# Patient Record
Sex: FEMALE | Race: BLACK OR AFRICAN AMERICAN | Employment: OTHER | URBAN - METROPOLITAN AREA
[De-identification: names, ages, dates, MRNs, and addresses within clinical notes are randomized per-mention and may not be internally consistent; named-entity substitution may affect disease eponyms.]

---

## 2017-10-21 PROBLEM — J45.901 ASTHMA EXACERBATION: Status: ACTIVE | Noted: 2017-10-21

## 2017-10-21 PROBLEM — H92.09 OTALGIA: Status: ACTIVE | Noted: 2017-05-24

## 2017-10-22 PROBLEM — J45.41 MODERATE PERSISTENT ASTHMA WITH EXACERBATION: Status: ACTIVE | Noted: 2017-10-21

## 2018-09-29 ENCOUNTER — APPOINTMENT (OUTPATIENT)
Dept: GENERAL RADIOLOGY | Age: 83
DRG: 202 | End: 2018-09-29
Payer: MEDICARE

## 2018-09-29 ENCOUNTER — HOSPITAL ENCOUNTER (INPATIENT)
Age: 83
LOS: 3 days | Discharge: HOME | DRG: 202 | End: 2018-10-05
Attending: EMERGENCY MEDICINE | Admitting: INTERNAL MEDICINE
Payer: MEDICARE

## 2018-09-29 DIAGNOSIS — J44.1 COPD EXACERBATION (HCC): Primary | ICD-10-CM

## 2018-09-29 PROBLEM — J45.901 ASTHMA EXACERBATION, MILD: Status: ACTIVE | Noted: 2018-09-29

## 2018-09-29 LAB
ANION GAP SERPL CALCULATED.3IONS-SCNC: 15 MMOL/L (ref 3–16)
BASE EXCESS VENOUS: 1 (ref -3–3)
BASOPHILS ABSOLUTE: 0 K/UL (ref 0–0.2)
BASOPHILS RELATIVE PERCENT: 0.6 %
BUN BLDV-MCNC: 28 MG/DL (ref 7–20)
CALCIUM SERPL-MCNC: 10 MG/DL (ref 8.3–10.6)
CHLORIDE BLD-SCNC: 99 MMOL/L (ref 99–110)
CO2: 26 MMOL/L (ref 21–32)
CREAT SERPL-MCNC: 1.1 MG/DL (ref 0.6–1.2)
EOSINOPHILS ABSOLUTE: 0.1 K/UL (ref 0–0.6)
EOSINOPHILS RELATIVE PERCENT: 3.3 %
GFR AFRICAN AMERICAN: 58
GFR NON-AFRICAN AMERICAN: 48
GLUCOSE BLD-MCNC: 163 MG/DL (ref 70–99)
GLUCOSE BLD-MCNC: 345 MG/DL (ref 70–99)
HCO3 VENOUS: 26.2 MMOL/L (ref 23–29)
HCT VFR BLD CALC: 38.2 % (ref 36–48)
HEMOGLOBIN: 13.1 G/DL (ref 12–16)
LACTATE: 2.62 MMOL/L (ref 0.4–2)
LYMPHOCYTES ABSOLUTE: 1.4 K/UL (ref 1–5.1)
LYMPHOCYTES RELATIVE PERCENT: 31.6 %
MCH RBC QN AUTO: 31.8 PG (ref 26–34)
MCHC RBC AUTO-ENTMCNC: 34.2 G/DL (ref 31–36)
MCV RBC AUTO: 93.2 FL (ref 80–100)
MONOCYTES ABSOLUTE: 0.4 K/UL (ref 0–1.3)
MONOCYTES RELATIVE PERCENT: 9.2 %
NEUTROPHILS ABSOLUTE: 2.4 K/UL (ref 1.7–7.7)
NEUTROPHILS RELATIVE PERCENT: 55.3 %
O2 SAT, VEN: 73 %
PCO2, VEN: 44.8 MM HG (ref 40–50)
PDW BLD-RTO: 12.9 % (ref 12.4–15.4)
PERFORMED ON: ABNORMAL
PERFORMED ON: ABNORMAL
PERFORMED ON: NORMAL
PH VENOUS: 7.38 (ref 7.35–7.45)
PLATELET # BLD: 229 K/UL (ref 135–450)
PMV BLD AUTO: 7.7 FL (ref 5–10.5)
PO2, VEN: 40 MM HG
POC SAMPLE TYPE: ABNORMAL
POC SAMPLE TYPE: NORMAL
POC TROPONIN I: 0.01 NG/ML (ref 0–0.1)
POTASSIUM REFLEX MAGNESIUM: 3.7 MMOL/L (ref 3.5–5.1)
RBC # BLD: 4.1 M/UL (ref 4–5.2)
SODIUM BLD-SCNC: 140 MMOL/L (ref 136–145)
TCO2 CALC VENOUS: 28 MMOL/L
WBC # BLD: 4.4 K/UL (ref 4–11)

## 2018-09-29 PROCEDURE — 2580000003 HC RX 258: Performed by: INTERNAL MEDICINE

## 2018-09-29 PROCEDURE — 85025 COMPLETE CBC W/AUTO DIFF WBC: CPT

## 2018-09-29 PROCEDURE — 6370000000 HC RX 637 (ALT 250 FOR IP): Performed by: INTERNAL MEDICINE

## 2018-09-29 PROCEDURE — 6370000000 HC RX 637 (ALT 250 FOR IP): Performed by: EMERGENCY MEDICINE

## 2018-09-29 PROCEDURE — 94640 AIRWAY INHALATION TREATMENT: CPT

## 2018-09-29 PROCEDURE — G0378 HOSPITAL OBSERVATION PER HR: HCPCS

## 2018-09-29 PROCEDURE — 96365 THER/PROPH/DIAG IV INF INIT: CPT

## 2018-09-29 PROCEDURE — 96375 TX/PRO/DX INJ NEW DRUG ADDON: CPT

## 2018-09-29 PROCEDURE — 96366 THER/PROPH/DIAG IV INF ADDON: CPT

## 2018-09-29 PROCEDURE — 94664 DEMO&/EVAL PT USE INHALER: CPT

## 2018-09-29 PROCEDURE — 93005 ELECTROCARDIOGRAM TRACING: CPT | Performed by: INTERNAL MEDICINE

## 2018-09-29 PROCEDURE — 6360000002 HC RX W HCPCS: Performed by: EMERGENCY MEDICINE

## 2018-09-29 PROCEDURE — 6360000002 HC RX W HCPCS: Performed by: INTERNAL MEDICINE

## 2018-09-29 PROCEDURE — 84484 ASSAY OF TROPONIN QUANT: CPT

## 2018-09-29 PROCEDURE — 71045 X-RAY EXAM CHEST 1 VIEW: CPT

## 2018-09-29 PROCEDURE — 82803 BLOOD GASES ANY COMBINATION: CPT

## 2018-09-29 PROCEDURE — 94761 N-INVAS EAR/PLS OXIMETRY MLT: CPT

## 2018-09-29 PROCEDURE — 99291 CRITICAL CARE FIRST HOUR: CPT

## 2018-09-29 PROCEDURE — 83605 ASSAY OF LACTIC ACID: CPT

## 2018-09-29 PROCEDURE — 80048 BASIC METABOLIC PNL TOTAL CA: CPT

## 2018-09-29 PROCEDURE — 36415 COLL VENOUS BLD VENIPUNCTURE: CPT

## 2018-09-29 RX ORDER — ALBUTEROL SULFATE 2.5 MG/3ML
2.5 SOLUTION RESPIRATORY (INHALATION) ONCE
Status: COMPLETED | OUTPATIENT
Start: 2018-09-29 | End: 2018-09-29

## 2018-09-29 RX ORDER — BETAXOLOL 10 MG/1
10 TABLET, FILM COATED ORAL 2 TIMES DAILY
Status: DISCONTINUED | OUTPATIENT
Start: 2018-09-29 | End: 2018-10-05 | Stop reason: HOSPADM

## 2018-09-29 RX ORDER — PREDNISONE 20 MG/1
20 TABLET ORAL 2 TIMES DAILY
Status: DISCONTINUED | OUTPATIENT
Start: 2018-09-29 | End: 2018-10-04

## 2018-09-29 RX ORDER — HYDROCHLOROTHIAZIDE 25 MG/1
25 TABLET ORAL DAILY
Status: CANCELLED | OUTPATIENT
Start: 2018-09-29

## 2018-09-29 RX ORDER — MAGNESIUM SULFATE IN WATER 40 MG/ML
2 INJECTION, SOLUTION INTRAVENOUS ONCE
Status: COMPLETED | OUTPATIENT
Start: 2018-09-29 | End: 2018-09-29

## 2018-09-29 RX ORDER — FAMOTIDINE 20 MG/1
20 TABLET, FILM COATED ORAL DAILY
Status: DISCONTINUED | OUTPATIENT
Start: 2018-09-29 | End: 2018-09-29

## 2018-09-29 RX ORDER — ONDANSETRON 2 MG/ML
4 INJECTION INTRAMUSCULAR; INTRAVENOUS EVERY 6 HOURS PRN
Status: DISCONTINUED | OUTPATIENT
Start: 2018-09-29 | End: 2018-10-05 | Stop reason: HOSPADM

## 2018-09-29 RX ORDER — FAMOTIDINE 20 MG/1
20 TABLET, FILM COATED ORAL 2 TIMES DAILY
Status: DISCONTINUED | OUTPATIENT
Start: 2018-09-29 | End: 2018-09-29

## 2018-09-29 RX ORDER — MECLIZINE HYDROCHLORIDE 25 MG/1
25 TABLET ORAL DAILY
Status: DISCONTINUED | OUTPATIENT
Start: 2018-09-29 | End: 2018-10-05 | Stop reason: HOSPADM

## 2018-09-29 RX ORDER — ACETAMINOPHEN 325 MG/1
650 TABLET ORAL EVERY 4 HOURS PRN
Status: DISCONTINUED | OUTPATIENT
Start: 2018-09-29 | End: 2018-09-29

## 2018-09-29 RX ORDER — RANITIDINE 150 MG/1
300 TABLET ORAL NIGHTLY
Status: DISCONTINUED | OUTPATIENT
Start: 2018-09-29 | End: 2018-10-05 | Stop reason: HOSPADM

## 2018-09-29 RX ORDER — METHYLPREDNISOLONE SODIUM SUCCINATE 125 MG/2ML
125 INJECTION, POWDER, LYOPHILIZED, FOR SOLUTION INTRAMUSCULAR; INTRAVENOUS ONCE
Status: COMPLETED | OUTPATIENT
Start: 2018-09-29 | End: 2018-09-29

## 2018-09-29 RX ORDER — ALBUTEROL SULFATE 2.5 MG/3ML
2.5 SOLUTION RESPIRATORY (INHALATION) EVERY 4 HOURS
Status: DISCONTINUED | OUTPATIENT
Start: 2018-09-29 | End: 2018-09-30

## 2018-09-29 RX ORDER — DEXTROSE MONOHYDRATE 25 G/50ML
12.5 INJECTION, SOLUTION INTRAVENOUS PRN
Status: DISCONTINUED | OUTPATIENT
Start: 2018-09-29 | End: 2018-10-05 | Stop reason: HOSPADM

## 2018-09-29 RX ORDER — DEXTROSE MONOHYDRATE 50 MG/ML
100 INJECTION, SOLUTION INTRAVENOUS PRN
Status: DISCONTINUED | OUTPATIENT
Start: 2018-09-29 | End: 2018-10-05 | Stop reason: HOSPADM

## 2018-09-29 RX ORDER — ACETAMINOPHEN 160 MG/5ML
650 SOLUTION ORAL EVERY 4 HOURS PRN
Status: DISCONTINUED | OUTPATIENT
Start: 2018-09-29 | End: 2018-10-05 | Stop reason: HOSPADM

## 2018-09-29 RX ORDER — ALBUTEROL SULFATE 2.5 MG/3ML
2.5 SOLUTION RESPIRATORY (INHALATION)
Status: DISCONTINUED | OUTPATIENT
Start: 2018-10-19 | End: 2018-09-30

## 2018-09-29 RX ORDER — AMLODIPINE BESYLATE 2.5 MG/1
2.5 TABLET ORAL 2 TIMES DAILY
Status: DISCONTINUED | OUTPATIENT
Start: 2018-09-29 | End: 2018-10-05 | Stop reason: HOSPADM

## 2018-09-29 RX ORDER — IPRATROPIUM BROMIDE AND ALBUTEROL SULFATE 2.5; .5 MG/3ML; MG/3ML
1 SOLUTION RESPIRATORY (INHALATION) ONCE
Status: COMPLETED | OUTPATIENT
Start: 2018-09-29 | End: 2018-09-29

## 2018-09-29 RX ORDER — SODIUM CHLORIDE 0.9 % (FLUSH) 0.9 %
10 SYRINGE (ML) INJECTION PRN
Status: DISCONTINUED | OUTPATIENT
Start: 2018-09-29 | End: 2018-10-05 | Stop reason: HOSPADM

## 2018-09-29 RX ORDER — NICOTINE POLACRILEX 4 MG
15 LOZENGE BUCCAL PRN
Status: DISCONTINUED | OUTPATIENT
Start: 2018-09-29 | End: 2018-10-05 | Stop reason: HOSPADM

## 2018-09-29 RX ORDER — POTASSIUM CHLORIDE 20 MEQ/1
20 TABLET, EXTENDED RELEASE ORAL EVERY OTHER DAY
Status: CANCELLED | OUTPATIENT
Start: 2018-09-29

## 2018-09-29 RX ORDER — SODIUM CHLORIDE 0.9 % (FLUSH) 0.9 %
10 SYRINGE (ML) INJECTION EVERY 12 HOURS SCHEDULED
Status: DISCONTINUED | OUTPATIENT
Start: 2018-09-29 | End: 2018-10-05 | Stop reason: HOSPADM

## 2018-09-29 RX ADMIN — INSULIN LISPRO 2 UNITS: 100 INJECTION, SOLUTION INTRAVENOUS; SUBCUTANEOUS at 23:36

## 2018-09-29 RX ADMIN — METHYLPREDNISOLONE SODIUM SUCCINATE 125 MG: 125 INJECTION, POWDER, FOR SOLUTION INTRAMUSCULAR; INTRAVENOUS at 14:46

## 2018-09-29 RX ADMIN — ALBUTEROL SULFATE 2.5 MG: 2.5 SOLUTION RESPIRATORY (INHALATION) at 14:58

## 2018-09-29 RX ADMIN — ALBUTEROL SULFATE 2.5 MG: 2.5 SOLUTION RESPIRATORY (INHALATION) at 21:20

## 2018-09-29 RX ADMIN — RANITIDINE 300 MG: 150 TABLET ORAL at 22:52

## 2018-09-29 RX ADMIN — IPRATROPIUM BROMIDE AND ALBUTEROL SULFATE 1 AMPULE: .5; 3 SOLUTION RESPIRATORY (INHALATION) at 14:58

## 2018-09-29 RX ADMIN — BETAXOLOL 10 MG: 10 TABLET, FILM COATED ORAL at 22:24

## 2018-09-29 RX ADMIN — MAGNESIUM SULFATE HEPTAHYDRATE 2 G: 40 INJECTION, SOLUTION INTRAVENOUS at 14:54

## 2018-09-29 RX ADMIN — METFORMIN HYDROCHLORIDE 250 MG: 500 TABLET, FILM COATED ORAL at 22:18

## 2018-09-29 RX ADMIN — PREDNISONE 20 MG: 20 TABLET ORAL at 22:20

## 2018-09-29 RX ADMIN — AMLODIPINE BESYLATE 2.5 MG: 2.5 TABLET ORAL at 22:18

## 2018-09-29 RX ADMIN — ACETAMINOPHEN 650 MG: 160 SOLUTION ORAL at 22:18

## 2018-09-29 RX ADMIN — Medication 10 ML: at 22:19

## 2018-09-29 ASSESSMENT — ENCOUNTER SYMPTOMS
EYES NEGATIVE: 1
CHEST TIGHTNESS: 1
WHEEZING: 1
GASTROINTESTINAL NEGATIVE: 1
SHORTNESS OF BREATH: 1
APNEA: 0

## 2018-09-29 ASSESSMENT — PAIN SCALES - GENERAL: PAINLEVEL_OUTOF10: 4

## 2018-09-29 NOTE — ED PROVIDER NOTES
Normal range of motion. Neurological: She is alert and oriented to person, place, and time. Skin: Skin is warm and dry. She is not diaphoretic. Psychiatric: She has a normal mood and affect. Her behavior is normal. Judgment and thought content normal.       Diagnostic Results       RADIOLOGY:  XR CHEST PORTABLE   Final Result      Cardiomegaly. Clear lungs. LABS:   Results for orders placed or performed during the hospital encounter of 09/29/18   CBC auto differential   Result Value Ref Range    WBC 4.4 4.0 - 11.0 K/uL    RBC 4.10 4.00 - 5.20 M/uL    Hemoglobin 13.1 12.0 - 16.0 g/dL    Hematocrit 38.2 36.0 - 48.0 %    MCV 93.2 80.0 - 100.0 fL    MCH 31.8 26.0 - 34.0 pg    MCHC 34.2 31.0 - 36.0 g/dL    RDW 12.9 12.4 - 15.4 %    Platelets 941 476 - 899 K/uL    MPV 7.7 5.0 - 10.5 fL    Neutrophils % 55.3 %    Lymphocytes % 31.6 %    Monocytes % 9.2 %    Eosinophils % 3.3 %    Basophils % 0.6 %    Neutrophils # 2.4 1.7 - 7.7 K/uL    Lymphocytes # 1.4 1.0 - 5.1 K/uL    Monocytes # 0.4 0.0 - 1.3 K/uL    Eosinophils # 0.1 0.0 - 0.6 K/uL    Basophils # 0.0 0.0 - 0.2 K/uL   POCT Venous   Result Value Ref Range    pH, Sal 7.376 7.350 - 7.450    pCO2, Sal 44.8 40.0 - 50.0 mm Hg    pO2, Sal 40 Not Established mm Hg    HCO3, Venous 26.2 23.0 - 29.0 mmol/L    Base Excess, Sal 1 -3 - 3    O2 Sat, Sal 73 Not Established %    TC02 (Calc), Sal 28 Not Established mmol/L    Lactate 2.62 (H) 0.40 - 2.00 mmol/L    Sample Type SAL     Performed on SEE BELOW    POCT Venous   Result Value Ref Range    POC Troponin I 0.01 0.00 - 0.10 ng/mL    Sample Type SAL     Performed on SEE BELOW            RECENT VITALS:  BP: (!) 163/103, Temp: 98 °F (36.7 °C), Pulse: 118, Resp: 16, SpO2: 100 %     Procedures   none    ED Course     Nursing Notes, Past Medical Hx, Past Surgical Hx, Social Hx, Allergies, and Family Hx were reviewed.     The patient was given the following medications:  Orders Placed This Encounter   Medications   

## 2018-09-29 NOTE — PROGRESS NOTES
23 second inspiratory hold  4. Bronchodilators will be administered via Hand Held Nebulizer [facility]) to patients when ANY of the following criteria are met  a. Incognizant or uncooperative          b. Patients treated with HHN at Home        c. Unable to demonstrate proper use of MDI with spacer     d. RR > 30 bpm   5. Bronchodilators will be delivered via Metered Dose Inhaler (MDI), HHN, Aerogen to intubated patients on mechanical ventilation. 6. Inhalation medication orders will be delivered and/or substituted as outlined below. Aerosolized Medications Ordering and Administration Guidelines:    1. All Medications will be ordered by a physician, and their frequency and/or modality will be adjusted as defined by the patients Respiratory Severity Index (RSI) score. 2. If the patient does not have documented COPD, consider discontinuing anticholinergics when RSI is less than 9.  3. If the bronchospasm worsens (increased RSI), then the bronchodilator frequency can be increased to a maximum of every 4 hours. If greater than every 4 hours is required, the physician will be contacted. 4. If the bronchospasm improves, the frequency of the bronchodilator can be decreased, based on the patient's RSI, but not less than home treatment regimen frequency. 5. Bronchodilator(s) will be discontinued if patient has a RSI less than 9 and has received no scheduled or as needed treatment for 72  Hrs. Patients Ordered on a Mucolytic Agent:    1. Must always be administered with a bronchodilator. 2. Discontinue if patient experiences worsened bronchospasm, or secretions have lessened to the point that the patient is able to clear them with a cough. Anti-inflammatory and Combination Medications:    1.  If the patient lacks prior history of lung disease, is not using inhaled anti-inflammatory medication at home, and lacks wheezing by examination or by history for at least 24 hours, contact physician for possible

## 2018-09-29 NOTE — ED PROVIDER NOTES
ED Attending Attestation Note     Date of evaluation: 9/29/2018      Medical Decision Making      This patient was seen by the resident. I have seen and examined the patient, agree with the workup, evaluation, management and diagnosis. The care plan has been discussed. My assessment reveals 82-year-old female who reports history of asthma presenting with respiratory distress. Patient with minimal air movement initially with nausea saturations in the low 90s. We'll begin DuoNeb therapy as soon as possible give steroids and magnesium. We'll obtain labs and x-ray imaging and reassess patient. Reassessment 1551  Patient has improved however still with significant wheezing will plan admission for further treatment      Critical Care:  Due to the immediate potential for life-threatening deterioration due to Hypoxia/respiratory distress, I spent 35 minutes providing critical care. This time excludes time spent performing procedures but includes time spent on direct patient care, history retrieval, review of the chart, and discussions with patient, family, and consultant(s). Diagnostic Results     EKG   Performed 3103 trip by ED physician normal sinus rhythm rate 80   QRS 82  no ST or T-wave changes    RADIOLOGY:  XR CHEST PORTABLE   Final Result      Cardiomegaly. Clear lungs. LABS:   Labs Reviewed   POCT VENOUS - Abnormal; Notable for the following:        Result Value    Lactate 2.62 (*)     All other components within normal limits    Narrative:     Performed at: [facility]  [address], [facility] [address]   Phone [phone]   CBC WITH AUTO DIFFERENTIAL    Narrative:     Performed at: [facility]  [address], [facility] [address]   Phone [phone]   POCT TROPONIN   POCT CHEM BASIC W ICA   POCT BLOOD GASES   POCT VENOUS    Narrative:     Performed at:   [facility] [place]  [address]   Phone [phone]       RECENT VITALS:    BP: (!) 163/103, Temp: 98 °F (36.7 °C), Pulse: 118, Resp: 16       Disposition     CLINICAL IMPRESSION:  1. COPD exacerbation ([facility] [address]        DISPOSITION:       DISPOSITION Decision To Admit 09/29/2018 03:47:06 PM        (Please note that portions of this note were completed with a voice recognition program.  Efforts were made to edit the dictations but occasionally words are mis-transcribed. )             Zulema Rojas MD  09/29/18 [id number]

## 2018-09-29 NOTE — H&P
Hospital Medicine History & Physical      PCP: Robreto Sosa MD    Date of Admission: 9/29/2018    Date of Service: Pt seen/examined on 09/28/18 and placed in observation with expected LOS less than two midnights due to medical therapy. Chief Complaint:  Shortness of breath      History Of Present Illness:     82 y.o. female with past medical history of asthma, diabetes mellitus, hypertension, hyperlipidemia who presented to the [facility]. with shortness of breath. Patient states that she was using her regular inhaler but was still feeling very short of breath. She has not been taking her [facility] because it makes her sick. She felt that she she was unable to take a deep breath. She also complains of this cramp that comes on the right side hand and goes all the way up her arm towards her shoulder then travels down her chest and belly. She has to stop whatever she is doing and then it goes away. It lasts for only a few seconds. It has been going on for a few months. Last time when she was evaluated in the hospital.  She was told that she will have to get scanned to find out what's going on. She wanted to be evaluated and presented to the ER. In the ER, patient was given breathing treatment, magnesium and Solu-Medrol. Patient is being admitted to the floor for observation and further care. Past Medical History:          Diagnosis Date    Asthma     Diabetes mellitus ([address]     Hyperlipidemia     Hypertension        Past Surgical History:          Procedure Laterality Date    BACK SURGERY      BRAIN SURGERY      HYSTERECTOMY      TYMPANOPLASTY         Medications Prior to Admission:      Prior to Admission medications    Medication Sig Start Date End Date Taking?  Authorizing Provider   mometasone-formoterol [facility]) 100-5 MCG/ACT inhaler Inhale 2 puffs into the lungs 2 times daily 10/24/17   Lynda Redd DO   lactobacillus (CULTURELLE) capsule Take 1 capsule by mouth daily (with

## 2018-09-30 LAB
ANION GAP SERPL CALCULATED.3IONS-SCNC: 15 MMOL/L (ref 3–16)
BUN BLDV-MCNC: 30 MG/DL (ref 7–20)
CALCIUM SERPL-MCNC: 9.6 MG/DL (ref 8.3–10.6)
CHLORIDE BLD-SCNC: 99 MMOL/L (ref 99–110)
CO2: 25 MMOL/L (ref 21–32)
CREAT SERPL-MCNC: 1 MG/DL (ref 0.6–1.2)
EKG ATRIAL RATE: 80 BPM
EKG DIAGNOSIS: NORMAL
EKG P AXIS: 63 DEGREES
EKG P-R INTERVAL: 166 MS
EKG Q-T INTERVAL: 370 MS
EKG QRS DURATION: 82 MS
EKG QTC CALCULATION (BAZETT): 426 MS
EKG R AXIS: 49 DEGREES
EKG T AXIS: 67 DEGREES
EKG VENTRICULAR RATE: 80 BPM
GFR AFRICAN AMERICAN: >60
GFR NON-AFRICAN AMERICAN: 53
GLUCOSE BLD-MCNC: 162 MG/DL (ref 70–99)
GLUCOSE BLD-MCNC: 191 MG/DL (ref 70–99)
GLUCOSE BLD-MCNC: 205 MG/DL (ref 70–99)
GLUCOSE BLD-MCNC: 231 MG/DL (ref 70–99)
GLUCOSE BLD-MCNC: 250 MG/DL (ref 70–99)
GLUCOSE BLD-MCNC: 257 MG/DL (ref 70–99)
LACTIC ACID: 3.6 MMOL/L (ref 0.4–2)
PERFORMED ON: ABNORMAL
POTASSIUM REFLEX MAGNESIUM: 4.6 MMOL/L (ref 3.5–5.1)
SODIUM BLD-SCNC: 139 MMOL/L (ref 136–145)
TOTAL CK: 1006 U/L (ref 26–192)
VITAMIN D 25-HYDROXY: 18.9 NG/ML

## 2018-09-30 PROCEDURE — 80048 BASIC METABOLIC PNL TOTAL CA: CPT

## 2018-09-30 PROCEDURE — G0378 HOSPITAL OBSERVATION PER HR: HCPCS

## 2018-09-30 PROCEDURE — 2580000003 HC RX 258: Performed by: INTERNAL MEDICINE

## 2018-09-30 PROCEDURE — 36415 COLL VENOUS BLD VENIPUNCTURE: CPT

## 2018-09-30 PROCEDURE — 6370000000 HC RX 637 (ALT 250 FOR IP): Performed by: INTERNAL MEDICINE

## 2018-09-30 PROCEDURE — 6360000002 HC RX W HCPCS: Performed by: INTERNAL MEDICINE

## 2018-09-30 PROCEDURE — 83036 HEMOGLOBIN GLYCOSYLATED A1C: CPT

## 2018-09-30 PROCEDURE — 83605 ASSAY OF LACTIC ACID: CPT

## 2018-09-30 PROCEDURE — 82306 VITAMIN D 25 HYDROXY: CPT

## 2018-09-30 PROCEDURE — 82550 ASSAY OF CK (CPK): CPT

## 2018-09-30 PROCEDURE — 94761 N-INVAS EAR/PLS OXIMETRY MLT: CPT

## 2018-09-30 PROCEDURE — 94664 DEMO&/EVAL PT USE INHALER: CPT

## 2018-09-30 PROCEDURE — 94640 AIRWAY INHALATION TREATMENT: CPT

## 2018-09-30 PROCEDURE — 96372 THER/PROPH/DIAG INJ SC/IM: CPT

## 2018-09-30 RX ORDER — ALBUTEROL SULFATE 2.5 MG/3ML
2.5 SOLUTION RESPIRATORY (INHALATION) 4 TIMES DAILY
Status: DISCONTINUED | OUTPATIENT
Start: 2018-09-30 | End: 2018-10-03

## 2018-09-30 RX ORDER — HYDROCHLOROTHIAZIDE 25 MG/1
25 TABLET ORAL DAILY
Status: DISCONTINUED | OUTPATIENT
Start: 2018-09-30 | End: 2018-09-30

## 2018-09-30 RX ORDER — POTASSIUM CHLORIDE 20 MEQ/1
20 TABLET, EXTENDED RELEASE ORAL EVERY OTHER DAY
Status: DISCONTINUED | OUTPATIENT
Start: 2018-10-01 | End: 2018-10-04

## 2018-09-30 RX ORDER — BUDESONIDE 0.25 MG/2ML
0.25 INHALANT ORAL 2 TIMES DAILY
Status: DISCONTINUED | OUTPATIENT
Start: 2018-09-30 | End: 2018-10-01

## 2018-09-30 RX ORDER — HYDROCHLOROTHIAZIDE 25 MG/1
25 TABLET ORAL DAILY
Status: DISCONTINUED | OUTPATIENT
Start: 2018-10-01 | End: 2018-10-05 | Stop reason: HOSPADM

## 2018-09-30 RX ORDER — METHOCARBAMOL 500 MG/1
500 TABLET, FILM COATED ORAL 3 TIMES DAILY
Status: DISCONTINUED | OUTPATIENT
Start: 2018-09-30 | End: 2018-10-01

## 2018-09-30 RX ORDER — POTASSIUM CHLORIDE 20 MEQ/1
20 TABLET, EXTENDED RELEASE ORAL EVERY OTHER DAY
Status: DISCONTINUED | OUTPATIENT
Start: 2018-09-30 | End: 2018-09-30

## 2018-09-30 RX ADMIN — ALBUTEROL SULFATE 2.5 MG: 2.5 SOLUTION RESPIRATORY (INHALATION) at 11:08

## 2018-09-30 RX ADMIN — ALBUTEROL SULFATE 2.5 MG: 2.5 SOLUTION RESPIRATORY (INHALATION) at 22:21

## 2018-09-30 RX ADMIN — MECLIZINE HYDROCHLORIDE 25 MG: 25 TABLET ORAL at 10:15

## 2018-09-30 RX ADMIN — ALBUTEROL SULFATE 2.5 MG: 2.5 SOLUTION RESPIRATORY (INHALATION) at 07:53

## 2018-09-30 RX ADMIN — ALBUTEROL SULFATE 2.5 MG: 2.5 SOLUTION RESPIRATORY (INHALATION) at 04:05

## 2018-09-30 RX ADMIN — INSULIN LISPRO 4 UNITS: 100 INJECTION, SOLUTION INTRAVENOUS; SUBCUTANEOUS at 17:21

## 2018-09-30 RX ADMIN — AMLODIPINE BESYLATE 2.5 MG: 2.5 TABLET ORAL at 21:41

## 2018-09-30 RX ADMIN — ALBUTEROL SULFATE 2.5 MG: 2.5 SOLUTION RESPIRATORY (INHALATION) at 00:25

## 2018-09-30 RX ADMIN — Medication 10 ML: at 21:43

## 2018-09-30 RX ADMIN — RANITIDINE 300 MG: 150 TABLET ORAL at 21:43

## 2018-09-30 RX ADMIN — PREDNISONE 20 MG: 20 TABLET ORAL at 21:41

## 2018-09-30 RX ADMIN — BUDESONIDE 250 MCG: 0.25 SUSPENSION RESPIRATORY (INHALATION) at 07:54

## 2018-09-30 RX ADMIN — BETAXOLOL 10 MG: 10 TABLET, FILM COATED ORAL at 10:15

## 2018-09-30 RX ADMIN — PREDNISONE 20 MG: 20 TABLET ORAL at 10:14

## 2018-09-30 RX ADMIN — Medication 10 ML: at 10:16

## 2018-09-30 RX ADMIN — ALBUTEROL SULFATE 2.5 MG: 2.5 SOLUTION RESPIRATORY (INHALATION) at 15:37

## 2018-09-30 RX ADMIN — INSULIN LISPRO 3 UNITS: 100 INJECTION, SOLUTION INTRAVENOUS; SUBCUTANEOUS at 10:21

## 2018-09-30 RX ADMIN — BETAXOLOL 10 MG: 10 TABLET, FILM COATED ORAL at 21:42

## 2018-09-30 RX ADMIN — INSULIN LISPRO 1 UNITS: 100 INJECTION, SOLUTION INTRAVENOUS; SUBCUTANEOUS at 13:37

## 2018-09-30 RX ADMIN — BUDESONIDE 250 MCG: 0.25 SUSPENSION RESPIRATORY (INHALATION) at 22:21

## 2018-09-30 RX ADMIN — AMLODIPINE BESYLATE 2.5 MG: 2.5 TABLET ORAL at 10:14

## 2018-09-30 RX ADMIN — ENOXAPARIN SODIUM 40 MG: 40 INJECTION SUBCUTANEOUS at 10:15

## 2018-09-30 NOTE — PROGRESS NOTES
and dry  Neurologic:  Neurovascularly intact without any focal sensory/motor deficits. Cranial nerves: II-XII intact, grossly non-focal.  Psychiatric:  Alert and oriented  Capillary Refill: Brisk,< 3 seconds   Peripheral Pulses: +2 palpable, equal bilaterally     Labs:   Recent Labs      09/29/18   1457   WBC  4.4   HGB  13.1   HCT  38.2   PLT  229     Recent Labs      09/29/18   1457  09/30/18   0642   NA  140  139   K  3.7  4.6   CL  99  99   CO2  26  25   BUN  28*  30*   CREATININE  1.1  1.0   CALCIUM  10.0  9.6     No results for input(s): AST, ALT, BILIDIR, BILITOT, ALKPHOS in the last 72 hours. No results for input(s): INR in the last 72 hours. Recent Labs      09/29/18   1448   TROPONINI  0.01       Urinalysis:      Lab Results   Component Value Date    NITRU Negative 04/06/2016    WBCUA 3-5 04/06/2016    BACTERIA 3+ 04/06/2016    RBCUA 3-5 04/06/2016    BLOODU Negative 04/06/2016    SPECGRAV 1.010 04/06/2016    GLUCOSEU Negative 04/06/2016       Radiology:  XR CHEST PORTABLE   Final Result      Cardiomegaly. Clear lungs. Assessment/Plan:    Active Hospital Problems    Diagnosis Date Noted    Asthma exacerbation, mild [J45.901] 09/29/2018       PLAN:     Asthma exacerbation: Mild  Continue breathing treatment  Continue inhalers  Continue oral steroids     Diabetes mellitus:  Monitor blood glucose level  HbA1c ordered  Metformin discontinued  Sliding scale insulin increased     Elevated lactic acid: ? TypeB  Does not drink.  No Hx of bowel resection (D-lactic acidosis)  CBC wnl, Cr, HCO3 and AG wnl.   Might be sec to ?metformin or ?rhabdo from cramps(seems unlikely)  Metformin discontinued  Monitor lactic acid  CPK ordered    Hypertension:  Monitor blood pressure  Continue amlodipine betaxolol  Resumed HCTZ     Cramps:  Might be secondary to vitamin D deficiency  Vitamin D hydroxy level pending  Robaxin started     GERD:  Continue PPI     DVT Prophylaxis: Lovenox  Diet: DIET CARB CONTROL;  Code Status: Full Code     PT/OT Eval Status: Not ordered     Dispo - anticipate discharge in 1-2 days         Cristian Chavez MD

## 2018-10-01 ENCOUNTER — APPOINTMENT (OUTPATIENT)
Dept: CT IMAGING | Age: 83
DRG: 202 | End: 2018-10-01
Payer: MEDICARE

## 2018-10-01 PROBLEM — M54.12 CERVICAL RADICULOPATHY: Status: ACTIVE | Noted: 2018-10-01

## 2018-10-01 LAB
ESTIMATED AVERAGE GLUCOSE: 137 MG/DL
GLUCOSE BLD-MCNC: 193 MG/DL (ref 70–99)
GLUCOSE BLD-MCNC: 201 MG/DL (ref 70–99)
GLUCOSE BLD-MCNC: 205 MG/DL (ref 70–99)
GLUCOSE BLD-MCNC: 218 MG/DL (ref 70–99)
GLUCOSE BLD-MCNC: 310 MG/DL (ref 70–99)
HBA1C MFR BLD: 6.4 %
PERFORMED ON: ABNORMAL
TOTAL CK: 655 U/L (ref 26–192)
TROPONIN: 0.17 NG/ML

## 2018-10-01 PROCEDURE — G0378 HOSPITAL OBSERVATION PER HR: HCPCS

## 2018-10-01 PROCEDURE — 6370000000 HC RX 637 (ALT 250 FOR IP): Performed by: INTERNAL MEDICINE

## 2018-10-01 PROCEDURE — 84484 ASSAY OF TROPONIN QUANT: CPT

## 2018-10-01 PROCEDURE — 82550 ASSAY OF CK (CPK): CPT

## 2018-10-01 PROCEDURE — 94640 AIRWAY INHALATION TREATMENT: CPT

## 2018-10-01 PROCEDURE — 94761 N-INVAS EAR/PLS OXIMETRY MLT: CPT

## 2018-10-01 PROCEDURE — 6360000002 HC RX W HCPCS: Performed by: INTERNAL MEDICINE

## 2018-10-01 PROCEDURE — 36415 COLL VENOUS BLD VENIPUNCTURE: CPT

## 2018-10-01 PROCEDURE — 2580000003 HC RX 258: Performed by: INTERNAL MEDICINE

## 2018-10-01 RX ORDER — BUDESONIDE 0.25 MG/2ML
0.25 INHALANT ORAL EVERY 12 HOURS PRN
Status: DISCONTINUED | OUTPATIENT
Start: 2018-10-01 | End: 2018-10-05 | Stop reason: HOSPADM

## 2018-10-01 RX ORDER — BUDESONIDE AND FORMOTEROL FUMARATE DIHYDRATE 80; 4.5 UG/1; UG/1
1 AEROSOL RESPIRATORY (INHALATION) 2 TIMES DAILY
Status: DISCONTINUED | OUTPATIENT
Start: 2018-10-01 | End: 2018-10-02

## 2018-10-01 RX ADMIN — HYDROCHLOROTHIAZIDE 25 MG: 25 TABLET ORAL at 09:04

## 2018-10-01 RX ADMIN — AMLODIPINE BESYLATE 2.5 MG: 2.5 TABLET ORAL at 21:55

## 2018-10-01 RX ADMIN — RANITIDINE 300 MG: 150 TABLET ORAL at 21:55

## 2018-10-01 RX ADMIN — ALBUTEROL SULFATE 2.5 MG: 2.5 SOLUTION RESPIRATORY (INHALATION) at 12:03

## 2018-10-01 RX ADMIN — ALBUTEROL SULFATE 2.5 MG: 2.5 SOLUTION RESPIRATORY (INHALATION) at 08:04

## 2018-10-01 RX ADMIN — INSULIN LISPRO 4 UNITS: 100 INJECTION, SOLUTION INTRAVENOUS; SUBCUTANEOUS at 09:09

## 2018-10-01 RX ADMIN — POTASSIUM CHLORIDE 20 MEQ: 20 TABLET, EXTENDED RELEASE ORAL at 09:04

## 2018-10-01 RX ADMIN — BETAXOLOL 10 MG: 10 TABLET, FILM COATED ORAL at 21:56

## 2018-10-01 RX ADMIN — BUDESONIDE 250 MCG: 0.25 SUSPENSION RESPIRATORY (INHALATION) at 08:05

## 2018-10-01 RX ADMIN — PREDNISONE 20 MG: 20 TABLET ORAL at 21:55

## 2018-10-01 RX ADMIN — INSULIN LISPRO 2 UNITS: 100 INJECTION, SOLUTION INTRAVENOUS; SUBCUTANEOUS at 18:43

## 2018-10-01 RX ADMIN — INSULIN LISPRO 2 UNITS: 100 INJECTION, SOLUTION INTRAVENOUS; SUBCUTANEOUS at 00:57

## 2018-10-01 RX ADMIN — BETAXOLOL 10 MG: 10 TABLET, FILM COATED ORAL at 09:09

## 2018-10-01 RX ADMIN — MECLIZINE HYDROCHLORIDE 25 MG: 25 TABLET ORAL at 09:05

## 2018-10-01 RX ADMIN — Medication 10 ML: at 21:57

## 2018-10-01 RX ADMIN — AMLODIPINE BESYLATE 2.5 MG: 2.5 TABLET ORAL at 09:04

## 2018-10-01 RX ADMIN — PREDNISONE 20 MG: 20 TABLET ORAL at 09:04

## 2018-10-01 RX ADMIN — INSULIN LISPRO 4 UNITS: 100 INJECTION, SOLUTION INTRAVENOUS; SUBCUTANEOUS at 12:43

## 2018-10-01 RX ADMIN — ALBUTEROL SULFATE 2.5 MG: 2.5 SOLUTION RESPIRATORY (INHALATION) at 15:56

## 2018-10-01 RX ADMIN — Medication 10 ML: at 09:11

## 2018-10-01 RX ADMIN — ALBUTEROL SULFATE 2.5 MG: 2.5 SOLUTION RESPIRATORY (INHALATION) at 22:19

## 2018-10-01 RX ADMIN — METHOCARBAMOL 500 MG: 500 TABLET ORAL at 09:07

## 2018-10-01 ASSESSMENT — PAIN SCALES - GENERAL
PAINLEVEL_OUTOF10: 3
PAINLEVEL_OUTOF10: 0

## 2018-10-01 ASSESSMENT — PAIN - FUNCTIONAL ASSESSMENT: PAIN_FUNCTIONAL_ASSESSMENT: 0-10

## 2018-10-01 ASSESSMENT — PAIN DESCRIPTION - DESCRIPTORS: DESCRIPTORS: ACHING

## 2018-10-01 ASSESSMENT — PAIN DESCRIPTION - PAIN TYPE: TYPE: ACUTE PAIN;CHRONIC PAIN

## 2018-10-01 ASSESSMENT — PAIN DESCRIPTION - LOCATION: LOCATION: ABDOMEN;BACK

## 2018-10-01 NOTE — PROGRESS NOTES
Medications    dextrose       Scheduled Medications    albuterol  2.5 mg Nebulization 4x daily    insulin lispro  0-12 Units Subcutaneous TID WC    insulin lispro  0-6 Units Subcutaneous Nightly    hydrochlorothiazide  25 mg Oral Daily    potassium chloride  20 mEq Oral Every Other Day    amLODIPine  2.5 mg Oral BID    betaxolol  10 mg Oral BID    meclizine  25 mg Oral Daily    sodium chloride flush  10 mL Intravenous 2 times per day    enoxaparin  40 mg Subcutaneous Daily    predniSONE  20 mg Oral BID    ranitidine  300 mg Oral Nightly     PRN Meds: budesonide, sodium chloride flush, magnesium hydroxide, ondansetron, glucose, dextrose, glucagon (rDNA), dextrose, acetaminophen        DVT Prophylaxis: Subcutaneous enoxaparin  Diet: DIET CARB CONTROL;  Code Status: Full Code    Dispo: Anticipate discharge in the next 48-72 hrs        Subjective:   Overnight Events:   Shortness of breath slowly improving  She complained of right upper extremity tingling with associated right-sided chest pain lasting for a brief period, this is associated with neck pain. Physical Exam Performed:  /76   Pulse 93   Temp 98.8 °F (37.1 °C) (Oral)   Resp 16   Wt 180 lb (81.6 kg)   SpO2 97%   BMI 30.90 kg/m²   General appearance: No apparent distress, appears stated age and cooperative. HEENT: Normocephalic, atraumatic, MMM, No sclera icterus/conjuctival palor  Neck: Supple, no thyromegally. No jugular venous distention. Respiratory:  Normal respiratory effort. Clear to auscultation, no Rales/Wheezes/Rhonchi. Cardiovascular: S1/S2 without murmurs, rubs or gallops. RRR  Abdomen: Soft, non-tender, non-distended, bowel sounds present. Musculoskeletal: No clubbing, cyanosis or edema bilaterally. Skin: Skin color, texture, turgor normal.  No rashes or lesions.   Neurologic:  Cranial nerves: II-XII intact, TERESO, No focal sensory/motor deficits  Psychiatric: Alert and oriented, thought content appropriate  Capillary Refill: Brisk,< 3 seconds   Peripheral Pulses: +2 palpable, equal bilaterally     No intake or output data in the 24 hours ending 10/01/18 1653    Labs:   Recent Labs      09/29/18   1457   WBC  4.4   HGB  13.1   HCT  38.2   PLT  229      Recent Labs      09/29/18   1457  09/30/18   0642   NA  140  139   K  3.7  4.6   CL  99  99   CO2  26  25   BUN  28*  30*   CREATININE  1.1  1.0   CALCIUM  10.0  9.6     Recent Labs      09/29/18   1448  09/30/18   0642  10/01/18   1000   CKTOTAL   --   1,006*  655*   TROPONINI  0.01   --   0.17*       Urinalysis:    Lab Results   Component Value Date    NITRU Negative 04/06/2016    WBCUA 3-5 04/06/2016    BACTERIA 3+ 04/06/2016    RBCUA 3-5 04/06/2016    BLOODU Negative 04/06/2016    SPECGRAV 1.010 04/06/2016    GLUCOSEU Negative 04/06/2016       Radiology:  XR CHEST PORTABLE   Final Result      Cardiomegaly. Clear lungs.       CT CERVICAL SPINE WO CONTRAST    (Results Pending)       Radiology        Eleni Alexis MD

## 2018-10-02 ENCOUNTER — APPOINTMENT (OUTPATIENT)
Dept: CT IMAGING | Age: 83
DRG: 202 | End: 2018-10-02
Payer: MEDICARE

## 2018-10-02 LAB
BACTERIA: ABNORMAL /HPF
BILIRUBIN URINE: NEGATIVE
BLOOD, URINE: NEGATIVE
CLARITY: CLEAR
COLOR: YELLOW
GLUCOSE BLD-MCNC: 165 MG/DL (ref 70–99)
GLUCOSE BLD-MCNC: 177 MG/DL (ref 70–99)
GLUCOSE BLD-MCNC: 279 MG/DL (ref 70–99)
GLUCOSE URINE: NEGATIVE MG/DL
KETONES, URINE: NEGATIVE MG/DL
LEUKOCYTE ESTERASE, URINE: ABNORMAL
LV EF: 58 %
LVEF MODALITY: NORMAL
MICROSCOPIC EXAMINATION: YES
NITRITE, URINE: NEGATIVE
PERFORMED ON: ABNORMAL
PH UA: 6
PROTEIN UA: NEGATIVE MG/DL
RBC UA: ABNORMAL /HPF (ref 0–2)
SPECIFIC GRAVITY UA: 1.02
URINE TYPE: ABNORMAL
UROBILINOGEN, URINE: 0.2 E.U./DL
WBC UA: ABNORMAL /HPF (ref 0–5)

## 2018-10-02 PROCEDURE — 1200000000 HC SEMI PRIVATE

## 2018-10-02 PROCEDURE — 2580000003 HC RX 258: Performed by: INTERNAL MEDICINE

## 2018-10-02 PROCEDURE — 94640 AIRWAY INHALATION TREATMENT: CPT

## 2018-10-02 PROCEDURE — G0378 HOSPITAL OBSERVATION PER HR: HCPCS

## 2018-10-02 PROCEDURE — 6370000000 HC RX 637 (ALT 250 FOR IP): Performed by: INTERNAL MEDICINE

## 2018-10-02 PROCEDURE — 6360000002 HC RX W HCPCS: Performed by: STUDENT IN AN ORGANIZED HEALTH CARE EDUCATION/TRAINING PROGRAM

## 2018-10-02 PROCEDURE — 6360000002 HC RX W HCPCS: Performed by: INTERNAL MEDICINE

## 2018-10-02 PROCEDURE — 72125 CT NECK SPINE W/O DYE: CPT

## 2018-10-02 PROCEDURE — 99223 1ST HOSP IP/OBS HIGH 75: CPT | Performed by: INTERNAL MEDICINE

## 2018-10-02 PROCEDURE — 94761 N-INVAS EAR/PLS OXIMETRY MLT: CPT

## 2018-10-02 PROCEDURE — 93306 TTE W/DOPPLER COMPLETE: CPT

## 2018-10-02 PROCEDURE — 94664 DEMO&/EVAL PT USE INHALER: CPT

## 2018-10-02 PROCEDURE — 81001 URINALYSIS AUTO W/SCOPE: CPT

## 2018-10-02 RX ORDER — BUDESONIDE 0.25 MG/2ML
0.25 INHALANT ORAL 2 TIMES DAILY
Status: DISCONTINUED | OUTPATIENT
Start: 2018-10-02 | End: 2018-10-05 | Stop reason: HOSPADM

## 2018-10-02 RX ADMIN — AMLODIPINE BESYLATE 2.5 MG: 2.5 TABLET ORAL at 21:33

## 2018-10-02 RX ADMIN — INSULIN LISPRO 6 UNITS: 100 INJECTION, SOLUTION INTRAVENOUS; SUBCUTANEOUS at 14:18

## 2018-10-02 RX ADMIN — HYDROCHLOROTHIAZIDE 25 MG: 25 TABLET ORAL at 09:41

## 2018-10-02 RX ADMIN — Medication 10 ML: at 09:43

## 2018-10-02 RX ADMIN — BETAXOLOL 10 MG: 10 TABLET, FILM COATED ORAL at 21:35

## 2018-10-02 RX ADMIN — ALBUTEROL SULFATE 2.5 MG: 2.5 SOLUTION RESPIRATORY (INHALATION) at 08:41

## 2018-10-02 RX ADMIN — AMLODIPINE BESYLATE 2.5 MG: 2.5 TABLET ORAL at 09:41

## 2018-10-02 RX ADMIN — PREDNISONE 20 MG: 20 TABLET ORAL at 21:33

## 2018-10-02 RX ADMIN — ALBUTEROL SULFATE 2.5 MG: 2.5 SOLUTION RESPIRATORY (INHALATION) at 16:26

## 2018-10-02 RX ADMIN — BETAXOLOL 10 MG: 10 TABLET, FILM COATED ORAL at 09:41

## 2018-10-02 RX ADMIN — MECLIZINE HYDROCHLORIDE 25 MG: 25 TABLET ORAL at 09:41

## 2018-10-02 RX ADMIN — PREDNISONE 20 MG: 20 TABLET ORAL at 09:41

## 2018-10-02 RX ADMIN — RANITIDINE 300 MG: 150 TABLET ORAL at 21:33

## 2018-10-02 RX ADMIN — ALBUTEROL SULFATE 2.5 MG: 2.5 SOLUTION RESPIRATORY (INHALATION) at 12:19

## 2018-10-02 RX ADMIN — ALBUTEROL SULFATE 2.5 MG: 2.5 SOLUTION RESPIRATORY (INHALATION) at 04:01

## 2018-10-02 RX ADMIN — INSULIN LISPRO 2 UNITS: 100 INJECTION, SOLUTION INTRAVENOUS; SUBCUTANEOUS at 20:02

## 2018-10-02 RX ADMIN — BUDESONIDE 250 MCG: 0.25 SUSPENSION RESPIRATORY (INHALATION) at 23:05

## 2018-10-02 RX ADMIN — Medication 10 ML: at 21:36

## 2018-10-02 RX ADMIN — ALBUTEROL SULFATE 2.5 MG: 2.5 SOLUTION RESPIRATORY (INHALATION) at 23:05

## 2018-10-02 RX ADMIN — INSULIN LISPRO 2 UNITS: 100 INJECTION, SOLUTION INTRAVENOUS; SUBCUTANEOUS at 09:40

## 2018-10-02 ASSESSMENT — PAIN SCALES - GENERAL: PAINLEVEL_OUTOF10: 0

## 2018-10-02 NOTE — PROGRESS NOTES
Medications    dextrose       Scheduled Medications    budesonide  0.25 mg Nebulization BID    albuterol  2.5 mg Nebulization 4x daily    insulin lispro  0-12 Units Subcutaneous TID WC    insulin lispro  0-6 Units Subcutaneous Nightly    hydrochlorothiazide  25 mg Oral Daily    potassium chloride  20 mEq Oral Every Other Day    amLODIPine  2.5 mg Oral BID    betaxolol  10 mg Oral BID    meclizine  25 mg Oral Daily    sodium chloride flush  10 mL Intravenous 2 times per day    enoxaparin  40 mg Subcutaneous Daily    predniSONE  20 mg Oral BID    ranitidine  300 mg Oral Nightly     PRN Meds: budesonide, sodium chloride flush, magnesium hydroxide, ondansetron, glucose, dextrose, glucagon (rDNA), dextrose, acetaminophen        DVT Prophylaxis: Subcutaneous enoxaparin  Diet: DIET CARB CONTROL;  Code Status: Full Code    Dispo: Anticipate discharge in the next 48-72 hrs        Subjective:   Overnight Events:   Shortness of breath slowly improving  She complained of right upper extremity tingling with associated right-sided chest pain lasting for a brief period, this is associated with neck pain. Physical Exam Performed:  BP (!) 142/91   Pulse 73   Temp 98.8 °F (37.1 °C) (Oral)   Resp 16   Wt 180 lb (81.6 kg)   SpO2 96%   BMI 30.90 kg/m²   General appearance: No apparent distress, appears stated age and cooperative. HEENT: Normocephalic, atraumatic, MMM, No sclera icterus/conjuctival palor  Neck: Supple, no thyromegally. No jugular venous distention. Respiratory:  Mild wheezing  Cardiovascular: S1/S2 without murmurs, rubs or gallops. RRR  Abdomen: Soft, non-tender, non-distended, bowel sounds present. Musculoskeletal: No clubbing, cyanosis or edema bilaterally. Skin: Skin color, texture, turgor normal.  No rashes or lesions.   Neurologic:  Cranial nerves: II-XII intact, TERESO, No focal sensory/motor deficits  Psychiatric: Alert and oriented, thought content appropriate  Capillary Refill:

## 2018-10-03 LAB
A/G RATIO: 1.6 (ref 1.1–2.2)
ALBUMIN SERPL-MCNC: 4.1 G/DL (ref 3.4–5)
ALP BLD-CCNC: 37 U/L (ref 40–129)
ALT SERPL-CCNC: 33 U/L (ref 10–40)
ANION GAP SERPL CALCULATED.3IONS-SCNC: 11 MMOL/L (ref 3–16)
AST SERPL-CCNC: 22 U/L (ref 15–37)
BILIRUB SERPL-MCNC: 0.5 MG/DL (ref 0–1)
BUN BLDV-MCNC: 28 MG/DL (ref 7–20)
CALCIUM SERPL-MCNC: 9.4 MG/DL (ref 8.3–10.6)
CHLORIDE BLD-SCNC: 102 MMOL/L (ref 99–110)
CO2: 27 MMOL/L (ref 21–32)
CREAT SERPL-MCNC: 1 MG/DL (ref 0.6–1.2)
GFR AFRICAN AMERICAN: >60
GFR NON-AFRICAN AMERICAN: 53
GLOBULIN: 2.5 G/DL
GLUCOSE BLD-MCNC: 139 MG/DL (ref 70–99)
GLUCOSE BLD-MCNC: 183 MG/DL (ref 70–99)
GLUCOSE BLD-MCNC: 210 MG/DL (ref 70–99)
GLUCOSE BLD-MCNC: 233 MG/DL (ref 70–99)
PERFORMED ON: ABNORMAL
POTASSIUM SERPL-SCNC: 4.4 MMOL/L (ref 3.5–5.1)
SODIUM BLD-SCNC: 140 MMOL/L (ref 136–145)
TOTAL CK: 264 U/L (ref 26–192)
TOTAL PROTEIN: 6.6 G/DL (ref 6.4–8.2)

## 2018-10-03 PROCEDURE — 6370000000 HC RX 637 (ALT 250 FOR IP): Performed by: INTERNAL MEDICINE

## 2018-10-03 PROCEDURE — 82550 ASSAY OF CK (CPK): CPT

## 2018-10-03 PROCEDURE — 2580000003 HC RX 258: Performed by: INTERNAL MEDICINE

## 2018-10-03 PROCEDURE — 80053 COMPREHEN METABOLIC PANEL: CPT

## 2018-10-03 PROCEDURE — 1200000000 HC SEMI PRIVATE

## 2018-10-03 PROCEDURE — 94640 AIRWAY INHALATION TREATMENT: CPT

## 2018-10-03 PROCEDURE — 99232 SBSQ HOSP IP/OBS MODERATE 35: CPT | Performed by: INTERNAL MEDICINE

## 2018-10-03 PROCEDURE — 6360000002 HC RX W HCPCS: Performed by: STUDENT IN AN ORGANIZED HEALTH CARE EDUCATION/TRAINING PROGRAM

## 2018-10-03 PROCEDURE — 94664 DEMO&/EVAL PT USE INHALER: CPT

## 2018-10-03 PROCEDURE — 94761 N-INVAS EAR/PLS OXIMETRY MLT: CPT

## 2018-10-03 PROCEDURE — 6360000002 HC RX W HCPCS: Performed by: INTERNAL MEDICINE

## 2018-10-03 PROCEDURE — 36415 COLL VENOUS BLD VENIPUNCTURE: CPT

## 2018-10-03 RX ORDER — ALBUTEROL SULFATE 2.5 MG/3ML
2.5 SOLUTION RESPIRATORY (INHALATION)
Status: DISCONTINUED | OUTPATIENT
Start: 2018-10-03 | End: 2018-10-05 | Stop reason: HOSPADM

## 2018-10-03 RX ORDER — CYCLOBENZAPRINE HCL 10 MG
5 TABLET ORAL 3 TIMES DAILY PRN
Status: DISCONTINUED | OUTPATIENT
Start: 2018-10-03 | End: 2018-10-05 | Stop reason: HOSPADM

## 2018-10-03 RX ADMIN — Medication 10 ML: at 20:38

## 2018-10-03 RX ADMIN — CYCLOBENZAPRINE HYDROCHLORIDE 5 MG: 10 TABLET, FILM COATED ORAL at 09:44

## 2018-10-03 RX ADMIN — ALBUTEROL SULFATE 2.5 MG: 2.5 SOLUTION RESPIRATORY (INHALATION) at 17:06

## 2018-10-03 RX ADMIN — PREDNISONE 20 MG: 20 TABLET ORAL at 09:44

## 2018-10-03 RX ADMIN — ACETAMINOPHEN 650 MG: 160 SOLUTION ORAL at 20:40

## 2018-10-03 RX ADMIN — AMLODIPINE BESYLATE 2.5 MG: 2.5 TABLET ORAL at 09:44

## 2018-10-03 RX ADMIN — PREDNISONE 20 MG: 20 TABLET ORAL at 20:36

## 2018-10-03 RX ADMIN — Medication 10 ML: at 09:45

## 2018-10-03 RX ADMIN — ALBUTEROL SULFATE 2.5 MG: 2.5 SOLUTION RESPIRATORY (INHALATION) at 12:54

## 2018-10-03 RX ADMIN — BETAXOLOL 10 MG: 10 TABLET, FILM COATED ORAL at 09:45

## 2018-10-03 RX ADMIN — POTASSIUM CHLORIDE 20 MEQ: 20 TABLET, EXTENDED RELEASE ORAL at 09:44

## 2018-10-03 RX ADMIN — BETAXOLOL 10 MG: 10 TABLET, FILM COATED ORAL at 20:37

## 2018-10-03 RX ADMIN — INSULIN LISPRO 4 UNITS: 100 INJECTION, SOLUTION INTRAVENOUS; SUBCUTANEOUS at 13:10

## 2018-10-03 RX ADMIN — BUDESONIDE 250 MCG: 0.25 SUSPENSION RESPIRATORY (INHALATION) at 19:58

## 2018-10-03 RX ADMIN — AMLODIPINE BESYLATE 2.5 MG: 2.5 TABLET ORAL at 20:37

## 2018-10-03 RX ADMIN — INSULIN LISPRO 4 UNITS: 100 INJECTION, SOLUTION INTRAVENOUS; SUBCUTANEOUS at 19:22

## 2018-10-03 RX ADMIN — CYCLOBENZAPRINE HYDROCHLORIDE 5 MG: 10 TABLET, FILM COATED ORAL at 20:40

## 2018-10-03 RX ADMIN — MECLIZINE HYDROCHLORIDE 25 MG: 25 TABLET ORAL at 09:47

## 2018-10-03 RX ADMIN — ALBUTEROL SULFATE 2.5 MG: 2.5 SOLUTION RESPIRATORY (INHALATION) at 09:05

## 2018-10-03 RX ADMIN — HYDROCHLOROTHIAZIDE 25 MG: 25 TABLET ORAL at 09:44

## 2018-10-03 RX ADMIN — RANITIDINE 300 MG: 150 TABLET ORAL at 20:37

## 2018-10-03 RX ADMIN — ALBUTEROL SULFATE 2.5 MG: 2.5 SOLUTION RESPIRATORY (INHALATION) at 19:58

## 2018-10-03 RX ADMIN — BUDESONIDE 250 MCG: 0.25 SUSPENSION RESPIRATORY (INHALATION) at 09:11

## 2018-10-03 ASSESSMENT — PAIN SCALES - GENERAL
PAINLEVEL_OUTOF10: 3
PAINLEVEL_OUTOF10: 0

## 2018-10-03 NOTE — PROGRESS NOTES
Hospitalist Progress Note      PCP: Carolann Bence, MD    Date of Admission: 9/29/2018    LOS: 1    Chief Complaint:   Chief Complaint   Patient presents with    Shortness of Breath       Case Summary:   82-year-old lady history of hypertension, type 2 diabetes, dyslipidemia, asthma who was admitted with asthma exacerbation complicated by probable cervical radiculopathy. Also complained of some cramping in the lower extremities for which laboratory workup revealed elevated CPK in the setting of elevated lactic acid and metformin use. Metformin since been discontinued. Active Hospital Problems    Diagnosis Date Noted    COPD exacerbation ([facility] [J44.1]     Cervical radiculopathy [M54.12] 10/01/2018    Asthma exacerbation, mild [J45.901] 09/29/2018    Essential hypertension [I10] 05/21/2008    Type 2 diabetes mellitus ([facility] [E11.9] 05/21/2008       Assessment/Plan:  Principal Problem:    Asthma exacerbation, mild: Sounds wheezy this morning with shortness of breath. ECHO with pEF-55-60%  Continue breathing treatment and we will increase her nebulizer frequency to every 4 while awake  Continue oral steroid therapy and inhaled steroids  Pulmonology following      Type 2 diabetes mellitus ([facility]): This has been managed with oral hypoglycemic agents. However on presentation she complained of cramps and lactic acid was elevated elevated CPK. Metformin is since been discontinued  Continue sliding scale insulin  Start low-dose glipizide at discharge      Elevated lactic acid: Patient is sitting of metformin use. This has since been discontinued. CPK trending down  Monitor CPKs      Active Problems:     Essential hypertension: Adequately BP control  Continue amlodipine and Betaxolol. Cervical radiculopathy: Intermediate and numbness to the right upper extremity and left side of the chest in the setting of chronic neck pain and possible myelopathy.  CT neck with DJD      Resolved Problems:    * No

## 2018-10-03 NOTE — PLAN OF CARE
Problem: Falls - Risk of:  Goal: Will remain free from falls  Will remain free from falls   Outcome: Ongoing  Pt ambulates with steady gait with aide of cane. Pt remains free from injury. Problem: OXYGENATION/RESPIRATORY FUNCTION  Goal: Patient will achieve/maintain normal respiratory rate/effort  Respiratory rate and effort will be within normal limits for the patient  Outcome: Ongoing  Pt remains on RA with sats ranging %. Lungs are mostly clear with exp wheezes at times. Scheduled prednisone given and respiratory is giving breathing treatments as ordered. Will continue to monitor respiratory status.   Electronically signed by Michelle Horne RN on 10/3/18 at 2:52 AM

## 2018-10-03 NOTE — PROGRESS NOTES
VSS.100% on ra. Pt has been resting in bed this evening. Ate all of her dinner and denies pain. Exp wheezes heard prior to breathing treatments. Pt denies SOB. No coughing noted. Pt now sleeping with resps easy and even. Call light in reach. Will continue to monitor for any needs.   Electronically signed by Adriana Cassidy RN on 10/3/18 at 12:53 AM

## 2018-10-03 NOTE — PROGRESS NOTES
Pulmonary Followup Note    Indication for visit: Asthma exacerbation     Subjective: No acute events overnight. Patient refused initial dose of Budesonide in the evening but took it later on in the night. Patient sleepy on presentation this AM. Was complaining of some R back pain and was given Flexeril per primary team for the pain which has made her sleepy. Patient had no acute complaints this AM.          albuterol  2.5 mg Nebulization Q4H WA    budesonide  0.25 mg Nebulization BID    insulin lispro  0-12 Units Subcutaneous TID WC    insulin lispro  0-6 Units Subcutaneous Nightly    hydrochlorothiazide  25 mg Oral Daily    potassium chloride  20 mEq Oral Every Other Day    amLODIPine  2.5 mg Oral BID    betaxolol  10 mg Oral BID    meclizine  25 mg Oral Daily    sodium chloride flush  10 mL Intravenous 2 times per day    enoxaparin  40 mg Subcutaneous Daily    predniSONE  20 mg Oral BID    ranitidine  300 mg Oral Nightly       Continuous Infusions:   dextrose         PHYSICAL EXAMINATION:  BP (!) 151/71 Comment: pt request  Pulse 76   Temp 98.1 °F (36.7 °C) (Oral)   Resp 16   Wt 180 lb (81.6 kg)   SpO2 95%   BMI 30.90 kg/m²     Gen: NAD. Speaking in full sentences with out using accessory resp muscles  HEENT: PERRL, EOMI, OP nl  Lung: Mild expiratory wheezing in BL lungs, no rales or rhonchi noted   CV: RRR without M/R/R  Abd: +BS, soft, NT/ND  Ext: Mild 1+ pitting edema     BMP:   Recent Labs      10/03/18   0621   NA  140   K  4.4   CL  102   CO2  27   BUN  28*   CREATININE  1.0     LIVER PROFILE:   Recent Labs      10/03/18   0621   AST  22   ALT  33   BILITOT  0.5   ALKPHOS  37*     CXR (9/29/18):      Impression       Cardiomegaly. Clear lungs.         ECHO (10/2/18): Normal left ventricle size, wall thickness, and systolic function with an   estimated ejection fraction of 55-60%. No regional wall motion abnormalities   are seen.  Diastolic filling parameters suggests normal diastolic function.   The right atrium is dilated. Estimated pulmonary artery systolic pressure is   at 29 mmHg assuming a right atrial pressure of 8 mmHg. No evidence of   significant valvular stenosis or regurgitation present. ASSESSMENT/PLAN:    -Asthma exacerbation: Has had multiple admissions in past, prior to admission, only had rescue albuterol inhaler at home and has not tolerated Dulera or Symbicort in the past, continues to smoke  -BL LE edema: has been issue prior to admission, patient has been on amlodipine for some time, ECHO shows no DD. Has had leg edema on amlodipine in the past     -cont Albuterol  -cont Pulmicort  -cont Prednisone 20 mg BID (day 5/5) and d/c after last dose this evening  -encourage patient to cont Pulmicort on discharge for at least 1 mos and to follow up with outpatient pulmonary clinic    Assessment and plan discussed with attending physician, Dr. Liseth Gu M.D.   Internal Medicine PGY-1  Pager: [phone]

## 2018-10-04 LAB
GLUCOSE BLD-MCNC: 130 MG/DL (ref 70–99)
GLUCOSE BLD-MCNC: 190 MG/DL (ref 70–99)
GLUCOSE BLD-MCNC: 191 MG/DL (ref 70–99)
GLUCOSE BLD-MCNC: 201 MG/DL (ref 70–99)
GLUCOSE BLD-MCNC: 334 MG/DL (ref 70–99)
PERFORMED ON: ABNORMAL

## 2018-10-04 PROCEDURE — 94761 N-INVAS EAR/PLS OXIMETRY MLT: CPT

## 2018-10-04 PROCEDURE — 6360000002 HC RX W HCPCS: Performed by: INTERNAL MEDICINE

## 2018-10-04 PROCEDURE — 6370000000 HC RX 637 (ALT 250 FOR IP): Performed by: INTERNAL MEDICINE

## 2018-10-04 PROCEDURE — 2580000003 HC RX 258: Performed by: INTERNAL MEDICINE

## 2018-10-04 PROCEDURE — 99233 SBSQ HOSP IP/OBS HIGH 50: CPT | Performed by: INTERNAL MEDICINE

## 2018-10-04 PROCEDURE — 94640 AIRWAY INHALATION TREATMENT: CPT

## 2018-10-04 PROCEDURE — 94760 N-INVAS EAR/PLS OXIMETRY 1: CPT

## 2018-10-04 PROCEDURE — 1200000000 HC SEMI PRIVATE

## 2018-10-04 PROCEDURE — 6360000002 HC RX W HCPCS: Performed by: STUDENT IN AN ORGANIZED HEALTH CARE EDUCATION/TRAINING PROGRAM

## 2018-10-04 RX ORDER — LIDOCAINE 50 MG/G
1 PATCH TOPICAL DAILY
Status: DISCONTINUED | OUTPATIENT
Start: 2018-10-04 | End: 2018-10-05 | Stop reason: HOSPADM

## 2018-10-04 RX ORDER — POTASSIUM CHLORIDE 750 MG/1
10 TABLET, EXTENDED RELEASE ORAL
Status: DISCONTINUED | OUTPATIENT
Start: 2018-10-04 | End: 2018-10-05 | Stop reason: HOSPADM

## 2018-10-04 RX ORDER — ALBUTEROL SULFATE 2.5 MG/3ML
2.5 SOLUTION RESPIRATORY (INHALATION) EVERY 4 HOURS PRN
Status: DISCONTINUED | OUTPATIENT
Start: 2018-10-04 | End: 2018-10-05 | Stop reason: HOSPADM

## 2018-10-04 RX ORDER — GLIPIZIDE 5 MG/1
5 TABLET ORAL
Status: DISCONTINUED | OUTPATIENT
Start: 2018-10-04 | End: 2018-10-05 | Stop reason: HOSPADM

## 2018-10-04 RX ORDER — GLIPIZIDE 5 MG/1
5 TABLET ORAL
Status: DISCONTINUED | OUTPATIENT
Start: 2018-10-04 | End: 2018-10-04

## 2018-10-04 RX ADMIN — BUDESONIDE 250 MCG: 0.25 SUSPENSION RESPIRATORY (INHALATION) at 07:32

## 2018-10-04 RX ADMIN — BETAXOLOL 10 MG: 10 TABLET, FILM COATED ORAL at 08:46

## 2018-10-04 RX ADMIN — AMLODIPINE BESYLATE 2.5 MG: 2.5 TABLET ORAL at 20:32

## 2018-10-04 RX ADMIN — GLIPIZIDE 5 MG: 5 TABLET ORAL at 17:39

## 2018-10-04 RX ADMIN — MECLIZINE HYDROCHLORIDE 25 MG: 25 TABLET ORAL at 08:45

## 2018-10-04 RX ADMIN — BUDESONIDE 250 MCG: 0.25 SUSPENSION RESPIRATORY (INHALATION) at 19:30

## 2018-10-04 RX ADMIN — GLIPIZIDE 5 MG: 5 TABLET ORAL at 12:20

## 2018-10-04 RX ADMIN — HYDROCHLOROTHIAZIDE 25 MG: 25 TABLET ORAL at 08:45

## 2018-10-04 RX ADMIN — CYCLOBENZAPRINE HYDROCHLORIDE 5 MG: 10 TABLET, FILM COATED ORAL at 09:06

## 2018-10-04 RX ADMIN — ALBUTEROL SULFATE 2.5 MG: 2.5 SOLUTION RESPIRATORY (INHALATION) at 07:31

## 2018-10-04 RX ADMIN — ALBUTEROL SULFATE 2.5 MG: 2.5 SOLUTION RESPIRATORY (INHALATION) at 00:41

## 2018-10-04 RX ADMIN — PREDNISONE 20 MG: 20 TABLET ORAL at 08:45

## 2018-10-04 RX ADMIN — RANITIDINE 300 MG: 150 TABLET ORAL at 20:32

## 2018-10-04 RX ADMIN — INSULIN LISPRO 4 UNITS: 100 INJECTION, SOLUTION INTRAVENOUS; SUBCUTANEOUS at 00:02

## 2018-10-04 RX ADMIN — Medication 10 ML: at 08:49

## 2018-10-04 RX ADMIN — ALBUTEROL SULFATE 2.5 MG: 2.5 SOLUTION RESPIRATORY (INHALATION) at 12:06

## 2018-10-04 RX ADMIN — INSULIN LISPRO 1 UNITS: 100 INJECTION, SOLUTION INTRAVENOUS; SUBCUTANEOUS at 22:39

## 2018-10-04 RX ADMIN — INSULIN LISPRO 2 UNITS: 100 INJECTION, SOLUTION INTRAVENOUS; SUBCUTANEOUS at 08:53

## 2018-10-04 RX ADMIN — INSULIN LISPRO 4 UNITS: 100 INJECTION, SOLUTION INTRAVENOUS; SUBCUTANEOUS at 12:41

## 2018-10-04 RX ADMIN — ALBUTEROL SULFATE 2.5 MG: 2.5 SOLUTION RESPIRATORY (INHALATION) at 15:55

## 2018-10-04 RX ADMIN — Medication 10 ML: at 20:34

## 2018-10-04 RX ADMIN — AMLODIPINE BESYLATE 2.5 MG: 2.5 TABLET ORAL at 08:45

## 2018-10-04 RX ADMIN — CYCLOBENZAPRINE HYDROCHLORIDE 5 MG: 10 TABLET, FILM COATED ORAL at 22:38

## 2018-10-04 RX ADMIN — BETAXOLOL 10 MG: 10 TABLET, FILM COATED ORAL at 20:32

## 2018-10-04 RX ADMIN — ALBUTEROL SULFATE 2.5 MG: 2.5 SOLUTION RESPIRATORY (INHALATION) at 22:00

## 2018-10-04 RX ADMIN — POTASSIUM CHLORIDE 10 MEQ: 10 TABLET, EXTENDED RELEASE ORAL at 12:20

## 2018-10-04 NOTE — PROGRESS NOTES
murmurs, rubs or gallops. RRR  Abdomen: Soft, non-tender, non-distended, bowel sounds present. Musculoskeletal: No clubbing, cyanosis or edema bilaterally. Skin: Skin color, texture, turgor normal.  No rashes or lesions. Neurologic:  Cranial nerves: II-XII intact, TERESO, No focal sensory/motor deficits  Psychiatric: Alert and oriented, thought content appropriate  Capillary Refill: Brisk,< 3 seconds   Peripheral Pulses: +2 palpable, equal bilaterally       Intake/Output Summary (Last 24 hours) at 10/04/18 1620  Last data filed at 10/04/18 0849   Gross per 24 hour   Intake               10 ml   Output                0 ml   Net               10 ml       Labs:   No results for input(s): WBC, HGB, HCT, PLT in the last 72 hours. Invalid input(s):  INR   Recent Labs      10/03/18   0621   NA  140   K  4.4   CL  102   CO2  27   BUN  28*   CREATININE  1.0   CALCIUM  9.4   AST  22   ALT  33   BILITOT  0.5   ALKPHOS  37*     Recent Labs      10/03/18   0621   CKTOTAL  264*       Urinalysis:    Lab Results   Component Value Date    NITRU Negative 10/02/2018    WBCUA 3-5 10/02/2018    BACTERIA Rare 10/02/2018    RBCUA 0-2 10/02/2018    BLOODU Negative 10/02/2018    SPECGRAV 1.020 10/02/2018    GLUCOSEU Negative 10/02/2018       Radiology:  CT CERVICAL SPINE WO CONTRAST   Final Result      Advanced multilevel spondylotic changes as described in detail above      XR CHEST PORTABLE   Final Result      Cardiomegaly. Clear lungs.           Radiology        Callie Parks MD

## 2018-10-05 VITALS
TEMPERATURE: 98.4 F | WEIGHT: 180 LBS | OXYGEN SATURATION: 96 % | DIASTOLIC BLOOD PRESSURE: 83 MMHG | BODY MASS INDEX: 30.9 KG/M2 | HEART RATE: 65 BPM | RESPIRATION RATE: 16 BRPM | SYSTOLIC BLOOD PRESSURE: 133 MMHG

## 2018-10-05 PROBLEM — R07.81 PLEURITIC CHEST PAIN: Status: ACTIVE | Noted: 2018-10-05

## 2018-10-05 LAB
GLUCOSE BLD-MCNC: 108 MG/DL (ref 70–99)
GLUCOSE BLD-MCNC: 142 MG/DL (ref 70–99)
PERFORMED ON: ABNORMAL
PERFORMED ON: ABNORMAL
TOTAL CK: 189 U/L (ref 26–192)

## 2018-10-05 PROCEDURE — 6370000000 HC RX 637 (ALT 250 FOR IP): Performed by: INTERNAL MEDICINE

## 2018-10-05 PROCEDURE — 6360000002 HC RX W HCPCS: Performed by: INTERNAL MEDICINE

## 2018-10-05 PROCEDURE — 94640 AIRWAY INHALATION TREATMENT: CPT

## 2018-10-05 PROCEDURE — 82550 ASSAY OF CK (CPK): CPT

## 2018-10-05 PROCEDURE — 36415 COLL VENOUS BLD VENIPUNCTURE: CPT

## 2018-10-05 PROCEDURE — 2580000003 HC RX 258: Performed by: INTERNAL MEDICINE

## 2018-10-05 PROCEDURE — 94761 N-INVAS EAR/PLS OXIMETRY MLT: CPT

## 2018-10-05 PROCEDURE — 94664 DEMO&/EVAL PT USE INHALER: CPT

## 2018-10-05 PROCEDURE — 6360000002 HC RX W HCPCS: Performed by: STUDENT IN AN ORGANIZED HEALTH CARE EDUCATION/TRAINING PROGRAM

## 2018-10-05 RX ORDER — LIDOCAINE 50 MG/G
1 PATCH TOPICAL DAILY
Qty: 30 PATCH | Refills: 0 | Status: ON HOLD | OUTPATIENT
Start: 2018-10-05 | End: 2020-10-01

## 2018-10-05 RX ORDER — GLIPIZIDE 5 MG/1
5 TABLET ORAL
Qty: 60 TABLET | Refills: 3 | Status: SHIPPED | OUTPATIENT
Start: 2018-10-05 | End: 2019-03-05

## 2018-10-05 RX ORDER — ALBUTEROL SULFATE 2.5 MG/3ML
2.5 SOLUTION RESPIRATORY (INHALATION) EVERY 4 HOURS PRN
Qty: 120 EACH | Refills: 3 | Status: SHIPPED | OUTPATIENT
Start: 2018-10-05 | End: 2019-03-05 | Stop reason: SDUPTHER

## 2018-10-05 RX ORDER — CYCLOBENZAPRINE HCL 5 MG
5 TABLET ORAL 3 TIMES DAILY PRN
Qty: 20 TABLET | Refills: 0 | Status: SHIPPED | OUTPATIENT
Start: 2018-10-05 | End: 2018-10-15

## 2018-10-05 RX ORDER — BUDESONIDE 0.25 MG/2ML
250 INHALANT ORAL 2 TIMES DAILY
Qty: 60 AMPULE | Refills: 3 | Status: ON HOLD | OUTPATIENT
Start: 2018-10-05 | End: 2019-09-09 | Stop reason: HOSPADM

## 2018-10-05 RX ADMIN — ALBUTEROL SULFATE 2.5 MG: 2.5 SOLUTION RESPIRATORY (INHALATION) at 09:33

## 2018-10-05 RX ADMIN — MECLIZINE HYDROCHLORIDE 25 MG: 25 TABLET ORAL at 09:58

## 2018-10-05 RX ADMIN — BETAXOLOL 10 MG: 10 TABLET, FILM COATED ORAL at 10:12

## 2018-10-05 RX ADMIN — AMLODIPINE BESYLATE 2.5 MG: 2.5 TABLET ORAL at 09:58

## 2018-10-05 RX ADMIN — POTASSIUM CHLORIDE 10 MEQ: 10 TABLET, EXTENDED RELEASE ORAL at 09:58

## 2018-10-05 RX ADMIN — HYDROCHLOROTHIAZIDE 25 MG: 25 TABLET ORAL at 09:58

## 2018-10-05 RX ADMIN — BUDESONIDE 250 MCG: 0.25 SUSPENSION RESPIRATORY (INHALATION) at 09:33

## 2018-10-05 RX ADMIN — CYCLOBENZAPRINE HYDROCHLORIDE 5 MG: 10 TABLET, FILM COATED ORAL at 10:13

## 2018-10-05 RX ADMIN — Medication 10 ML: at 09:59

## 2018-10-05 RX ADMIN — GLIPIZIDE 5 MG: 5 TABLET ORAL at 09:58

## 2018-10-05 NOTE — PLAN OF CARE
Problem: Falls - Risk of:  Goal: Will remain free from falls  Will remain free from falls   Outcome: Ongoing  Patient free from falls. Bed in lowest position and locked. Call light and belongings within reach. Non skid socks on. Patient is up ad jeremías with steady gait. Will continue to monitor.

## 2018-10-05 NOTE — DISCHARGE SUMMARY
Hospital Medicine Discharge Summary    Patient ID: Danielle Zacarias      Patient's PCP: Michael Chavez MD    Admit Date: 9/29/2018     Discharge Date:   10/5/2018     Admitting Physician: Renetta Raya MD     Discharge Physician: Bassam Churchill MD     Discharge Diagnoses: Active Hospital Problems    Diagnosis Date Noted    Pleuritic chest pain [R07.81] 10/05/2018    COPD exacerbation (HCC) [J44.1]     Cervical radiculopathy [M54.12] 10/01/2018    Asthma exacerbation, mild [J45.901] 09/29/2018    Essential hypertension [I10] 05/21/2008    Type 2 diabetes mellitus ([facility] [address] [E11.9] 05/21/2008       The patient was seen and examined on day of discharge and this discharge summary is in conjunction with any daily progress note from day of discharge. Hospital Course: Te patient is a 68-year-old lady history of hypertension, type 2 diabetes, dyslipidemia, asthma who was admitted with asthma exacerbation complicated by probable cervical radiculopathy. Also complained of some cramping in the lower extremities for which laboratory workup revealed elevated CPK in the setting of elevated lactic acid and metformin use. Metformin since been discontinued and started on glipizide  She complained of right pleuritic chest pain and has Lidoderm patch prescribed          Active Hospital Problems     Diagnosis Date Noted    COPD exacerbation ([facility] [address] [J44.1]      Cervical radiculopathy [M54.12] 10/01/2018    Asthma exacerbation, mild [J45.901] 09/29/2018    Essential hypertension [I10] 05/21/2008    [facility] [E11.9] 05/21/2008         Assessment/Plan:  Principal Problem:    Asthma exacerbation, mild: improved shortness of breath. ECHO with pEF-55-60%  Continue breathing treatment with PRN recrue and BID inhaled steroids  Continue inhaled steroids. Pulmonology discontinued oral steroid therapy 2days prior to discharge. She has completed 5 days of prednisone.   Pulmonology to follow in

## 2018-10-05 NOTE — PROGRESS NOTES
Patient alert and oriented x4. VSS on room air. Receiving scheduled respiratory treatments. PRN Flexeril given for muscle spasms. Pt up ad jeremías with steady gait. Denies further needs at this time. Will continue to monitor.

## 2018-10-23 ENCOUNTER — TELEPHONE (OUTPATIENT)
Dept: PULMONOLOGY | Age: 83
End: 2018-10-23

## 2019-02-07 ENCOUNTER — HOSPITAL ENCOUNTER (EMERGENCY)
Age: 84
Discharge: HOME | End: 2019-02-07
Attending: EMERGENCY MEDICINE
Payer: MEDICARE

## 2019-02-07 ENCOUNTER — APPOINTMENT (OUTPATIENT)
Dept: GENERAL RADIOLOGY | Age: 84
End: 2019-02-07
Payer: MEDICARE

## 2019-02-07 VITALS
SYSTOLIC BLOOD PRESSURE: 174 MMHG | DIASTOLIC BLOOD PRESSURE: 74 MMHG | TEMPERATURE: 98.3 F | OXYGEN SATURATION: 95 % | HEART RATE: 88 BPM | RESPIRATION RATE: 20 BRPM

## 2019-02-07 DIAGNOSIS — R07.89 CHEST WALL PAIN: Primary | ICD-10-CM

## 2019-02-07 LAB
CALCIUM IONIZED: 1.15 MMOL/L (ref 1.12–1.32)
CO2: 28 MMOL/L (ref 21–32)
EKG ATRIAL RATE: 87 BPM
EKG DIAGNOSIS: NORMAL
EKG P AXIS: 58 DEGREES
EKG P-R INTERVAL: 166 MS
EKG Q-T INTERVAL: 372 MS
EKG QRS DURATION: 78 MS
EKG QTC CALCULATION (BAZETT): 447 MS
EKG R AXIS: 46 DEGREES
EKG T AXIS: 59 DEGREES
EKG VENTRICULAR RATE: 87 BPM
GFR AFRICAN AMERICAN: >60
GFR NON-AFRICAN AMERICAN: 53
GLUCOSE BLD-MCNC: 128 MG/DL (ref 70–99)
PERFORMED ON: ABNORMAL
PERFORMED ON: NORMAL
POC ANION GAP: 12 (ref 10–20)
POC BUN: 21 MG/DL (ref 7–18)
POC CHLORIDE: 101 MMOL/L (ref 99–110)
POC CREATININE: 1 MG/DL (ref 0.6–1.2)
POC POTASSIUM: 4.1 MMOL/L (ref 3.5–5.1)
POC SAMPLE TYPE: ABNORMAL
POC SAMPLE TYPE: NORMAL
POC SODIUM: 141 MMOL/L (ref 136–145)
POC TROPONIN I: 0.01 NG/ML (ref 0–0.1)

## 2019-02-07 PROCEDURE — 99285 EMERGENCY DEPT VISIT HI MDM: CPT

## 2019-02-07 PROCEDURE — 80047 BASIC METABLC PNL IONIZED CA: CPT

## 2019-02-07 PROCEDURE — 93005 ELECTROCARDIOGRAM TRACING: CPT | Performed by: EMERGENCY MEDICINE

## 2019-02-07 PROCEDURE — 71046 X-RAY EXAM CHEST 2 VIEWS: CPT

## 2019-02-07 PROCEDURE — 6370000000 HC RX 637 (ALT 250 FOR IP): Performed by: EMERGENCY MEDICINE

## 2019-02-07 PROCEDURE — 84484 ASSAY OF TROPONIN QUANT: CPT

## 2019-02-07 RX ORDER — ONDANSETRON 2 MG/ML
4 INJECTION INTRAMUSCULAR; INTRAVENOUS EVERY 30 MIN PRN
Status: DISCONTINUED | OUTPATIENT
Start: 2019-02-07 | End: 2019-02-07

## 2019-02-07 RX ORDER — CYCLOBENZAPRINE HCL 5 MG
5 TABLET ORAL 2 TIMES DAILY PRN
Qty: 30 TABLET | Refills: 0 | Status: SHIPPED | OUTPATIENT
Start: 2019-02-07 | End: 2020-10-01

## 2019-02-07 RX ORDER — ACETAMINOPHEN 160 MG/5ML
650 SOLUTION ORAL ONCE
Status: COMPLETED | OUTPATIENT
Start: 2019-02-07 | End: 2019-02-07

## 2019-02-07 RX ORDER — ACETAMINOPHEN 325 MG/1
650 TABLET ORAL ONCE
Status: DISCONTINUED | OUTPATIENT
Start: 2019-02-07 | End: 2019-02-07

## 2019-02-07 RX ORDER — DIAZEPAM 5 MG/1
2.5 TABLET ORAL ONCE
Status: COMPLETED | OUTPATIENT
Start: 2019-02-07 | End: 2019-02-07

## 2019-02-07 RX ADMIN — DIAZEPAM 2.5 MG: 5 TABLET ORAL at 04:55

## 2019-02-07 RX ADMIN — ACETAMINOPHEN 650 MG: 650 SOLUTION ORAL at 06:11

## 2019-02-07 ASSESSMENT — PAIN DESCRIPTION - PAIN TYPE: TYPE: ACUTE PAIN

## 2019-02-07 ASSESSMENT — PAIN DESCRIPTION - LOCATION: LOCATION: CHEST

## 2019-02-07 ASSESSMENT — PAIN SCALES - GENERAL
PAINLEVEL_OUTOF10: 10
PAINLEVEL_OUTOF10: 10

## 2019-02-07 ASSESSMENT — PAIN DESCRIPTION - ORIENTATION: ORIENTATION: LEFT

## 2019-02-07 ASSESSMENT — PAIN DESCRIPTION - DESCRIPTORS: DESCRIPTORS: SHARP

## 2019-02-07 ASSESSMENT — PAIN DESCRIPTION - FREQUENCY: FREQUENCY: INTERMITTENT

## 2019-03-05 ENCOUNTER — OFFICE VISIT (OUTPATIENT)
Dept: PULMONOLOGY | Age: 84
End: 2019-03-05
Payer: MEDICARE

## 2019-03-05 DIAGNOSIS — J44.9 ASTHMA WITH COPD (HCC): Primary | ICD-10-CM

## 2019-03-05 DIAGNOSIS — J45.30 MILD PERSISTENT ASTHMA WITHOUT COMPLICATION: ICD-10-CM

## 2019-03-05 PROCEDURE — 99214 OFFICE O/P EST MOD 30 MIN: CPT | Performed by: INTERNAL MEDICINE

## 2019-03-05 RX ORDER — ALBUTEROL SULFATE 2.5 MG/3ML
2.5 SOLUTION RESPIRATORY (INHALATION) EVERY 4 HOURS PRN
Qty: 240 EACH | Refills: 3 | Status: SHIPPED | OUTPATIENT
Start: 2019-03-05 | End: 2019-06-20 | Stop reason: SDUPTHER

## 2019-03-05 ASSESSMENT — ENCOUNTER SYMPTOMS
WHEEZING: 1
SHORTNESS OF BREATH: 1
COUGH: 0

## 2019-03-06 VITALS
HEIGHT: 64 IN | HEART RATE: 86 BPM | SYSTOLIC BLOOD PRESSURE: 150 MMHG | OXYGEN SATURATION: 94 % | BODY MASS INDEX: 31.24 KG/M2 | DIASTOLIC BLOOD PRESSURE: 80 MMHG | WEIGHT: 183 LBS

## 2019-03-11 ENCOUNTER — HOSPITAL ENCOUNTER (OUTPATIENT)
Dept: PULMONOLOGY | Age: 84
Discharge: HOME | End: 2019-03-11
Payer: MEDICARE

## 2019-03-11 RX ORDER — ALBUTEROL SULFATE 2.5 MG/3ML
2.5 SOLUTION RESPIRATORY (INHALATION) ONCE
Status: DISCONTINUED | OUTPATIENT
Start: 2019-03-11 | End: 2019-03-14 | Stop reason: HOSPADM

## 2019-03-12 ENCOUNTER — TELEPHONE (OUTPATIENT)
Dept: PULMONOLOGY | Age: 84
End: 2019-03-12

## 2019-06-20 DIAGNOSIS — J44.9 ASTHMA WITH COPD (HCC): ICD-10-CM

## 2019-06-21 RX ORDER — ALBUTEROL SULFATE 2.5 MG/3ML
SOLUTION RESPIRATORY (INHALATION)
Qty: 120 VIAL | Refills: 3 | Status: SHIPPED | OUTPATIENT
Start: 2019-06-21 | End: 2019-08-23 | Stop reason: SDUPTHER

## 2019-08-23 DIAGNOSIS — J44.9 ASTHMA WITH COPD (HCC): Primary | ICD-10-CM

## 2019-08-23 RX ORDER — ALBUTEROL SULFATE 2.5 MG/3ML
SOLUTION RESPIRATORY (INHALATION)
Qty: 120 ML | Refills: 3 | Status: ON HOLD | OUTPATIENT
Start: 2019-08-23 | End: 2019-09-09 | Stop reason: SDUPTHER

## 2019-09-07 ENCOUNTER — HOSPITAL ENCOUNTER (OUTPATIENT)
Age: 84
Setting detail: OBSERVATION
Discharge: HOME | End: 2019-09-09
Attending: EMERGENCY MEDICINE | Admitting: INTERNAL MEDICINE
Payer: MEDICARE

## 2019-09-07 ENCOUNTER — APPOINTMENT (OUTPATIENT)
Dept: GENERAL RADIOLOGY | Age: 84
End: 2019-09-07
Payer: MEDICARE

## 2019-09-07 DIAGNOSIS — J45.41 MODERATE PERSISTENT ASTHMA WITH EXACERBATION: Primary | ICD-10-CM

## 2019-09-07 DIAGNOSIS — J44.9 ASTHMA WITH COPD (HCC): ICD-10-CM

## 2019-09-07 PROBLEM — J45.901 ACUTE ASTHMA EXACERBATION: Status: ACTIVE | Noted: 2019-09-07

## 2019-09-07 LAB
ANION GAP SERPL CALCULATED.3IONS-SCNC: 12 MMOL/L (ref 3–16)
BASE EXCESS VENOUS: 4.1 MMOL/L (ref -2–3)
BASOPHILS ABSOLUTE: 0 K/UL (ref 0–0.2)
BASOPHILS RELATIVE PERCENT: 1.1 %
BUN BLDV-MCNC: 25 MG/DL (ref 7–20)
CALCIUM SERPL-MCNC: 9.3 MG/DL (ref 8.3–10.6)
CARBOXYHEMOGLOBIN: 1.6 % (ref 0–1.5)
CHLORIDE BLD-SCNC: 100 MMOL/L (ref 99–110)
CO2: 27 MMOL/L (ref 21–32)
CREAT SERPL-MCNC: 1.3 MG/DL (ref 0.6–1.2)
EOSINOPHILS ABSOLUTE: 0.2 K/UL (ref 0–0.6)
EOSINOPHILS RELATIVE PERCENT: 4.5 %
GFR AFRICAN AMERICAN: 47
GFR NON-AFRICAN AMERICAN: 39
GLUCOSE BLD-MCNC: 111 MG/DL (ref 70–99)
GLUCOSE BLD-MCNC: 166 MG/DL (ref 70–99)
HCO3 VENOUS: 29.3 MMOL/L (ref 24–28)
HCT VFR BLD CALC: 39 % (ref 36–48)
HEMOGLOBIN, VEN, REDUCED: 6 %
HEMOGLOBIN: 13.1 G/DL (ref 12–16)
LYMPHOCYTES ABSOLUTE: 1.3 K/UL (ref 1–5.1)
LYMPHOCYTES RELATIVE PERCENT: 29.8 %
MCH RBC QN AUTO: 32.3 PG (ref 26–34)
MCHC RBC AUTO-ENTMCNC: 33.5 G/DL (ref 31–36)
MCV RBC AUTO: 96.5 FL (ref 80–100)
METHEMOGLOBIN VENOUS: 0.5 % (ref 0–1.5)
MONOCYTES ABSOLUTE: 0.4 K/UL (ref 0–1.3)
MONOCYTES RELATIVE PERCENT: 10.6 %
NEUTROPHILS ABSOLUTE: 2.3 K/UL (ref 1.7–7.7)
NEUTROPHILS RELATIVE PERCENT: 54 %
O2 SAT, VEN: 94 %
PCO2, VEN: 48.3 MMHG (ref 41–51)
PDW BLD-RTO: 12.5 % (ref 12.4–15.4)
PERFORMED ON: ABNORMAL
PH VENOUS: 7.4 (ref 7.35–7.45)
PLATELET # BLD: 224 K/UL (ref 135–450)
PMV BLD AUTO: 7.9 FL (ref 5–10.5)
PO2, VEN: 70.9 MMHG (ref 25–40)
POTASSIUM REFLEX MAGNESIUM: 3.7 MMOL/L (ref 3.5–5.1)
RAPID INFLUENZA  B AGN: NEGATIVE
RAPID INFLUENZA A AGN: NEGATIVE
RBC # BLD: 4.05 M/UL (ref 4–5.2)
SODIUM BLD-SCNC: 139 MMOL/L (ref 136–145)
TCO2 CALC VENOUS: 31 MMOL/L
WBC # BLD: 4.2 K/UL (ref 4–11)

## 2019-09-07 PROCEDURE — 6370000000 HC RX 637 (ALT 250 FOR IP): Performed by: INTERNAL MEDICINE

## 2019-09-07 PROCEDURE — 83036 HEMOGLOBIN GLYCOSYLATED A1C: CPT

## 2019-09-07 PROCEDURE — 6360000002 HC RX W HCPCS: Performed by: EMERGENCY MEDICINE

## 2019-09-07 PROCEDURE — 99285 EMERGENCY DEPT VISIT HI MDM: CPT

## 2019-09-07 PROCEDURE — 6360000002 HC RX W HCPCS: Performed by: INTERNAL MEDICINE

## 2019-09-07 PROCEDURE — G0378 HOSPITAL OBSERVATION PER HR: HCPCS

## 2019-09-07 PROCEDURE — 6370000000 HC RX 637 (ALT 250 FOR IP): Performed by: EMERGENCY MEDICINE

## 2019-09-07 PROCEDURE — 71046 X-RAY EXAM CHEST 2 VIEWS: CPT

## 2019-09-07 PROCEDURE — 93005 ELECTROCARDIOGRAM TRACING: CPT | Performed by: INTERNAL MEDICINE

## 2019-09-07 PROCEDURE — 2580000003 HC RX 258: Performed by: INTERNAL MEDICINE

## 2019-09-07 PROCEDURE — 96366 THER/PROPH/DIAG IV INF ADDON: CPT

## 2019-09-07 PROCEDURE — 85025 COMPLETE CBC W/AUTO DIFF WBC: CPT

## 2019-09-07 PROCEDURE — 94640 AIRWAY INHALATION TREATMENT: CPT

## 2019-09-07 PROCEDURE — 87804 INFLUENZA ASSAY W/OPTIC: CPT

## 2019-09-07 PROCEDURE — 82803 BLOOD GASES ANY COMBINATION: CPT

## 2019-09-07 PROCEDURE — 36415 COLL VENOUS BLD VENIPUNCTURE: CPT

## 2019-09-07 PROCEDURE — 80048 BASIC METABOLIC PNL TOTAL CA: CPT

## 2019-09-07 PROCEDURE — 96365 THER/PROPH/DIAG IV INF INIT: CPT

## 2019-09-07 RX ORDER — SODIUM CHLORIDE 0.9 % (FLUSH) 0.9 %
10 SYRINGE (ML) INJECTION PRN
Status: DISCONTINUED | OUTPATIENT
Start: 2019-09-07 | End: 2019-09-09 | Stop reason: HOSPADM

## 2019-09-07 RX ORDER — IPRATROPIUM BROMIDE AND ALBUTEROL SULFATE 2.5; .5 MG/3ML; MG/3ML
1 SOLUTION RESPIRATORY (INHALATION)
Status: DISCONTINUED | OUTPATIENT
Start: 2019-09-08 | End: 2019-09-08

## 2019-09-07 RX ORDER — SODIUM CHLORIDE 9 MG/ML
INJECTION, SOLUTION INTRAVENOUS CONTINUOUS
Status: ACTIVE | OUTPATIENT
Start: 2019-09-07 | End: 2019-09-08

## 2019-09-07 RX ORDER — FAMOTIDINE 20 MG/1
20 TABLET, FILM COATED ORAL NIGHTLY
Status: DISCONTINUED | OUTPATIENT
Start: 2019-09-07 | End: 2019-09-08

## 2019-09-07 RX ORDER — DEXTROSE MONOHYDRATE 25 G/50ML
12.5 INJECTION, SOLUTION INTRAVENOUS PRN
Status: DISCONTINUED | OUTPATIENT
Start: 2019-09-07 | End: 2019-09-09 | Stop reason: HOSPADM

## 2019-09-07 RX ORDER — ALBUTEROL SULFATE 2.5 MG/3ML
2.5 SOLUTION RESPIRATORY (INHALATION) ONCE
Status: COMPLETED | OUTPATIENT
Start: 2019-09-07 | End: 2019-09-07

## 2019-09-07 RX ORDER — PREDNISONE 20 MG/1
60 TABLET ORAL ONCE
Status: COMPLETED | OUTPATIENT
Start: 2019-09-07 | End: 2019-09-07

## 2019-09-07 RX ORDER — NICOTINE POLACRILEX 4 MG
15 LOZENGE BUCCAL PRN
Status: DISCONTINUED | OUTPATIENT
Start: 2019-09-07 | End: 2019-09-09 | Stop reason: HOSPADM

## 2019-09-07 RX ORDER — DEXTROSE MONOHYDRATE 50 MG/ML
100 INJECTION, SOLUTION INTRAVENOUS PRN
Status: DISCONTINUED | OUTPATIENT
Start: 2019-09-07 | End: 2019-09-09 | Stop reason: HOSPADM

## 2019-09-07 RX ORDER — PREDNISONE 20 MG/1
40 TABLET ORAL DAILY
Status: DISCONTINUED | OUTPATIENT
Start: 2019-09-08 | End: 2019-09-08

## 2019-09-07 RX ORDER — LACTOBACILLUS RHAMNOSUS GG 10B CELL
1 CAPSULE ORAL
Status: DISCONTINUED | OUTPATIENT
Start: 2019-09-08 | End: 2019-09-09

## 2019-09-07 RX ORDER — SODIUM CHLORIDE 0.9 % (FLUSH) 0.9 %
10 SYRINGE (ML) INJECTION EVERY 12 HOURS SCHEDULED
Status: DISCONTINUED | OUTPATIENT
Start: 2019-09-07 | End: 2019-09-09 | Stop reason: HOSPADM

## 2019-09-07 RX ORDER — ALBUTEROL SULFATE 2.5 MG/3ML
2.5 SOLUTION RESPIRATORY (INHALATION)
Status: DISCONTINUED | OUTPATIENT
Start: 2019-09-27 | End: 2019-09-08

## 2019-09-07 RX ORDER — IPRATROPIUM BROMIDE AND ALBUTEROL SULFATE 2.5; .5 MG/3ML; MG/3ML
1 SOLUTION RESPIRATORY (INHALATION) ONCE
Status: COMPLETED | OUTPATIENT
Start: 2019-09-07 | End: 2019-09-07

## 2019-09-07 RX ORDER — ONDANSETRON 2 MG/ML
4 INJECTION INTRAMUSCULAR; INTRAVENOUS EVERY 6 HOURS PRN
Status: DISCONTINUED | OUTPATIENT
Start: 2019-09-07 | End: 2019-09-09 | Stop reason: HOSPADM

## 2019-09-07 RX ORDER — AMLODIPINE BESYLATE 2.5 MG/1
2.5 TABLET ORAL 2 TIMES DAILY
Status: DISCONTINUED | OUTPATIENT
Start: 2019-09-07 | End: 2019-09-09 | Stop reason: HOSPADM

## 2019-09-07 RX ORDER — MAGNESIUM SULFATE IN WATER 40 MG/ML
2 INJECTION, SOLUTION INTRAVENOUS ONCE
Status: COMPLETED | OUTPATIENT
Start: 2019-09-07 | End: 2019-09-07

## 2019-09-07 RX ORDER — ALBUTEROL SULFATE 2.5 MG/3ML
2.5 SOLUTION RESPIRATORY (INHALATION)
Status: DISCONTINUED | OUTPATIENT
Start: 2019-09-07 | End: 2019-09-09 | Stop reason: HOSPADM

## 2019-09-07 RX ORDER — ACETAMINOPHEN 325 MG/1
650 TABLET ORAL EVERY 4 HOURS PRN
Status: DISCONTINUED | OUTPATIENT
Start: 2019-09-07 | End: 2019-09-08

## 2019-09-07 RX ADMIN — INSULIN GLARGINE 10 UNITS: 100 INJECTION, SOLUTION SUBCUTANEOUS at 22:55

## 2019-09-07 RX ADMIN — SODIUM CHLORIDE: 9 INJECTION, SOLUTION INTRAVENOUS at 22:45

## 2019-09-07 RX ADMIN — INSULIN LISPRO 1 UNITS: 100 INJECTION, SOLUTION INTRAVENOUS; SUBCUTANEOUS at 22:55

## 2019-09-07 RX ADMIN — IPRATROPIUM BROMIDE AND ALBUTEROL SULFATE 1 AMPULE: .5; 3 SOLUTION RESPIRATORY (INHALATION) at 19:42

## 2019-09-07 RX ADMIN — Medication 10 ML: at 22:45

## 2019-09-07 RX ADMIN — PREDNISONE 60 MG: 20 TABLET ORAL at 19:14

## 2019-09-07 RX ADMIN — ALBUTEROL SULFATE 2.5 MG: 2.5 SOLUTION RESPIRATORY (INHALATION) at 23:56

## 2019-09-07 RX ADMIN — MAGNESIUM SULFATE HEPTAHYDRATE 2 G: 40 INJECTION, SOLUTION INTRAVENOUS at 19:14

## 2019-09-07 RX ADMIN — ALBUTEROL SULFATE 2.5 MG: 2.5 SOLUTION RESPIRATORY (INHALATION) at 19:51

## 2019-09-07 ASSESSMENT — PAIN SCALES - GENERAL
PAINLEVEL_OUTOF10: 0
PAINLEVEL_OUTOF10: 0

## 2019-09-07 NOTE — ED PROVIDER NOTES
GFR  47 (A) >60    Calcium 9.3 8.3 - 10.6 mg/dL   Blood Gas, Venous   Result Value Ref Range    pH, Kenton 7.399 7.350 - 7.450    pCO2, [place] 48.3 41.0 - 51.0 mmHg    pO2, [place] 70.9 (H) 25.0 - 40.0 mmHg    HCO3, Venous 29.3 (H) 24.0 - 28.0 mmol/L    Base Excess, Kenton 4.1 (H) -2.0 - 3.0 mmol/L    O2 Sat, [place]on 94 Not established %    Carboxyhemoglobin 1.6 (H) 0.0 - 1.5 %    MetHgb, Kenton 0.5 0.0 - 1.5 %    TC02 (Calc), Kenton 31 mmol/L    Hemoglobin, [place]on, Reduced 6.00 %       ED BEDSIDE ULTRASOUND:      RECENT VITALS:  BP: (!) 156/55, Temp: 98.9 °F (37.2 °C), Pulse: 96,Resp: 20, SpO2: 96 %     Procedures         ED Course     Nursing Notes, Past Medical Hx, Past Surgical Hx, Social Hx, Allergies, and Family Hx were reviewed. The patient was given the followingmedications:  Orders Placed This Encounter   Medications    magnesium sulfate 2 g in 50 mL IVPB premix    FOLLOWED BY Linked Order Group     ipratropium-albuterol (DUONEB) nebulizer solution 1 ampule     albuterol (PROVENTIL) nebulizer solution 2.5 mg     albuterol (PROVENTIL) nebulizer solution 2.5 mg    predniSONE (DELTASONE) tablet 60 mg       CONSULTS:  IP CONSULT TO HOSPITALIST    [address] [facility] / ELDA / Ken Magdiel is a 82 y.o. female who presents with reported shortness of breath concerning for possible asthma exacerbation/worsening obstructive pulmonary disease. Following a thorough examination, blood work, electrocardiography, and plain films of the chest were ordered to assess for possible occult infectious and/or metabolic pathology contributing to the patient's reported shortness of breath. The patient's diagnostic evaluation was notable for a mild elevation in the patient's creatinine 1.3 (baseline of 1.15), but was otherwise unremarkable.     Given concern for acute exacerbation of the patient's underlying reactive airway disease, the patient was empirically treated with 60 mg of oral prednisone, 2 g of

## 2019-09-08 LAB
ANION GAP SERPL CALCULATED.3IONS-SCNC: 15 MMOL/L (ref 3–16)
BUN BLDV-MCNC: 26 MG/DL (ref 7–20)
CALCIUM SERPL-MCNC: 9.2 MG/DL (ref 8.3–10.6)
CHLORIDE BLD-SCNC: 99 MMOL/L (ref 99–110)
CO2: 25 MMOL/L (ref 21–32)
CREAT SERPL-MCNC: 1.1 MG/DL (ref 0.6–1.2)
EKG ATRIAL RATE: 88 BPM
EKG DIAGNOSIS: NORMAL
EKG P AXIS: 66 DEGREES
EKG P-R INTERVAL: 174 MS
EKG Q-T INTERVAL: 366 MS
EKG QRS DURATION: 80 MS
EKG QTC CALCULATION (BAZETT): 442 MS
EKG R AXIS: 46 DEGREES
EKG T AXIS: 57 DEGREES
EKG VENTRICULAR RATE: 88 BPM
ESTIMATED AVERAGE GLUCOSE: 148.5 MG/DL
GFR AFRICAN AMERICAN: 58
GFR NON-AFRICAN AMERICAN: 48
GLUCOSE BLD-MCNC: 176 MG/DL (ref 70–99)
GLUCOSE BLD-MCNC: 243 MG/DL (ref 70–99)
GLUCOSE BLD-MCNC: 255 MG/DL (ref 70–99)
GLUCOSE BLD-MCNC: 264 MG/DL (ref 70–99)
GLUCOSE BLD-MCNC: 338 MG/DL (ref 70–99)
HBA1C MFR BLD: 6.8 %
PERFORMED ON: ABNORMAL
POTASSIUM SERPL-SCNC: 4.2 MMOL/L (ref 3.5–5.1)
PROCALCITONIN: 0.19 NG/ML (ref 0–0.15)
SODIUM BLD-SCNC: 139 MMOL/L (ref 136–145)

## 2019-09-08 PROCEDURE — 6370000000 HC RX 637 (ALT 250 FOR IP): Performed by: INTERNAL MEDICINE

## 2019-09-08 PROCEDURE — 6360000002 HC RX W HCPCS: Performed by: INTERNAL MEDICINE

## 2019-09-08 PROCEDURE — 84145 PROCALCITONIN (PCT): CPT

## 2019-09-08 PROCEDURE — 36415 COLL VENOUS BLD VENIPUNCTURE: CPT

## 2019-09-08 PROCEDURE — G0378 HOSPITAL OBSERVATION PER HR: HCPCS

## 2019-09-08 PROCEDURE — 80048 BASIC METABOLIC PNL TOTAL CA: CPT

## 2019-09-08 PROCEDURE — 2580000003 HC RX 258: Performed by: INTERNAL MEDICINE

## 2019-09-08 PROCEDURE — 94640 AIRWAY INHALATION TREATMENT: CPT

## 2019-09-08 RX ORDER — FORMOTEROL FUMARATE 20 UG/2ML
20 SOLUTION RESPIRATORY (INHALATION) 2 TIMES DAILY
Status: DISCONTINUED | OUTPATIENT
Start: 2019-09-08 | End: 2019-09-09 | Stop reason: HOSPADM

## 2019-09-08 RX ORDER — MECLIZINE HYDROCHLORIDE 25 MG/1
25 TABLET ORAL 3 TIMES DAILY PRN
Status: DISCONTINUED | OUTPATIENT
Start: 2019-09-08 | End: 2019-09-09 | Stop reason: HOSPADM

## 2019-09-08 RX ORDER — IPRATROPIUM BROMIDE AND ALBUTEROL SULFATE 2.5; .5 MG/3ML; MG/3ML
1 SOLUTION RESPIRATORY (INHALATION) EVERY 4 HOURS
Status: DISCONTINUED | OUTPATIENT
Start: 2019-09-08 | End: 2019-09-08

## 2019-09-08 RX ORDER — ACETAMINOPHEN 160 MG/5ML
650 SOLUTION ORAL EVERY 4 HOURS PRN
Status: DISCONTINUED | OUTPATIENT
Start: 2019-09-08 | End: 2019-09-09 | Stop reason: HOSPADM

## 2019-09-08 RX ORDER — BUDESONIDE 0.5 MG/2ML
0.5 INHALANT ORAL 2 TIMES DAILY
Status: DISCONTINUED | OUTPATIENT
Start: 2019-09-08 | End: 2019-09-09 | Stop reason: HOSPADM

## 2019-09-08 RX ORDER — PANTOPRAZOLE SODIUM 40 MG/1
40 TABLET, DELAYED RELEASE ORAL
Status: DISCONTINUED | OUTPATIENT
Start: 2019-09-09 | End: 2019-09-09 | Stop reason: HOSPADM

## 2019-09-08 RX ORDER — IPRATROPIUM BROMIDE AND ALBUTEROL SULFATE 2.5; .5 MG/3ML; MG/3ML
1 SOLUTION RESPIRATORY (INHALATION) EVERY 4 HOURS
Status: DISCONTINUED | OUTPATIENT
Start: 2019-09-08 | End: 2019-09-09 | Stop reason: HOSPADM

## 2019-09-08 RX ADMIN — ALBUTEROL SULFATE 2.5 MG: 2.5 SOLUTION RESPIRATORY (INHALATION) at 12:41

## 2019-09-08 RX ADMIN — IPRATROPIUM BROMIDE AND ALBUTEROL SULFATE 1 AMPULE: .5; 3 SOLUTION RESPIRATORY (INHALATION) at 09:42

## 2019-09-08 RX ADMIN — FORMOTEROL FUMARATE DIHYDRATE 20 MCG: 20 SOLUTION RESPIRATORY (INHALATION) at 12:46

## 2019-09-08 RX ADMIN — Medication 10 ML: at 09:16

## 2019-09-08 RX ADMIN — FORMOTEROL FUMARATE DIHYDRATE 20 MCG: 20 SOLUTION RESPIRATORY (INHALATION) at 20:54

## 2019-09-08 RX ADMIN — BUDESONIDE 500 MCG: 0.5 SUSPENSION RESPIRATORY (INHALATION) at 12:46

## 2019-09-08 RX ADMIN — INSULIN LISPRO 6 UNITS: 100 INJECTION, SOLUTION INTRAVENOUS; SUBCUTANEOUS at 19:15

## 2019-09-08 RX ADMIN — ALBUTEROL SULFATE 2.5 MG: 2.5 SOLUTION RESPIRATORY (INHALATION) at 17:06

## 2019-09-08 RX ADMIN — AMLODIPINE BESYLATE 2.5 MG: 2.5 TABLET ORAL at 22:27

## 2019-09-08 RX ADMIN — PREDNISONE 40 MG: 20 TABLET ORAL at 09:15

## 2019-09-08 RX ADMIN — IPRATROPIUM BROMIDE AND ALBUTEROL SULFATE 1 AMPULE: .5; 3 SOLUTION RESPIRATORY (INHALATION) at 03:44

## 2019-09-08 RX ADMIN — INSULIN LISPRO 2 UNITS: 100 INJECTION, SOLUTION INTRAVENOUS; SUBCUTANEOUS at 22:28

## 2019-09-08 RX ADMIN — AMLODIPINE BESYLATE 2.5 MG: 2.5 TABLET ORAL at 09:15

## 2019-09-08 RX ADMIN — INSULIN GLARGINE 10 UNITS: 100 INJECTION, SOLUTION SUBCUTANEOUS at 22:28

## 2019-09-08 RX ADMIN — IPRATROPIUM BROMIDE AND ALBUTEROL SULFATE 1 AMPULE: .5; 3 SOLUTION RESPIRATORY (INHALATION) at 20:53

## 2019-09-08 RX ADMIN — INSULIN LISPRO 8 UNITS: 100 INJECTION, SOLUTION INTRAVENOUS; SUBCUTANEOUS at 09:17

## 2019-09-08 RX ADMIN — INSULIN LISPRO 2 UNITS: 100 INJECTION, SOLUTION INTRAVENOUS; SUBCUTANEOUS at 12:18

## 2019-09-08 RX ADMIN — MECLIZINE HYDROCHLORIDE 25 MG: 25 TABLET ORAL at 12:18

## 2019-09-08 RX ADMIN — BUDESONIDE 500 MCG: 0.5 SUSPENSION RESPIRATORY (INHALATION) at 20:54

## 2019-09-08 ASSESSMENT — PAIN DESCRIPTION - FREQUENCY: FREQUENCY: INTERMITTENT

## 2019-09-08 ASSESSMENT — PAIN DESCRIPTION - LOCATION: LOCATION: LEG

## 2019-09-08 ASSESSMENT — PAIN SCALES - GENERAL
PAINLEVEL_OUTOF10: 0
PAINLEVEL_OUTOF10: 5
PAINLEVEL_OUTOF10: 0
PAINLEVEL_OUTOF10: 0

## 2019-09-08 ASSESSMENT — PAIN DESCRIPTION - PAIN TYPE: TYPE: CHRONIC PAIN

## 2019-09-08 ASSESSMENT — PAIN DESCRIPTION - ORIENTATION: ORIENTATION: LEFT;RIGHT

## 2019-09-08 ASSESSMENT — PAIN DESCRIPTION - PROGRESSION: CLINICAL_PROGRESSION: NOT CHANGED

## 2019-09-08 ASSESSMENT — PAIN - FUNCTIONAL ASSESSMENT: PAIN_FUNCTIONAL_ASSESSMENT: ACTIVITIES ARE NOT PREVENTED

## 2019-09-08 ASSESSMENT — PAIN DESCRIPTION - DESCRIPTORS: DESCRIPTORS: ACHING

## 2019-09-08 ASSESSMENT — PAIN DESCRIPTION - ONSET: ONSET: PROGRESSIVE

## 2019-09-09 VITALS
HEIGHT: 64 IN | BODY MASS INDEX: 32.37 KG/M2 | WEIGHT: 189.6 LBS | HEART RATE: 96 BPM | DIASTOLIC BLOOD PRESSURE: 68 MMHG | RESPIRATION RATE: 18 BRPM | OXYGEN SATURATION: 99 % | SYSTOLIC BLOOD PRESSURE: 140 MMHG | TEMPERATURE: 98 F

## 2019-09-09 LAB
GLUCOSE BLD-MCNC: 166 MG/DL (ref 70–99)
PERFORMED ON: ABNORMAL

## 2019-09-09 PROCEDURE — 94640 AIRWAY INHALATION TREATMENT: CPT

## 2019-09-09 PROCEDURE — 6370000000 HC RX 637 (ALT 250 FOR IP): Performed by: INTERNAL MEDICINE

## 2019-09-09 PROCEDURE — G0378 HOSPITAL OBSERVATION PER HR: HCPCS

## 2019-09-09 PROCEDURE — 6360000002 HC RX W HCPCS: Performed by: INTERNAL MEDICINE

## 2019-09-09 RX ORDER — METOPROLOL SUCCINATE 25 MG/1
25 TABLET, EXTENDED RELEASE ORAL DAILY
Qty: 30 TABLET | Refills: 1 | Status: SHIPPED | OUTPATIENT
Start: 2019-09-09 | End: 2020-10-01

## 2019-09-09 RX ORDER — AZITHROMYCIN 250 MG/1
250 TABLET, FILM COATED ORAL DAILY
Qty: 5 TABLET | Refills: 0 | Status: SHIPPED | OUTPATIENT
Start: 2019-09-09 | End: 2019-09-09 | Stop reason: SDUPTHER

## 2019-09-09 RX ORDER — PREDNISONE 10 MG/1
TABLET ORAL
Qty: 30 TABLET | Refills: 0 | Status: SHIPPED | OUTPATIENT
Start: 2019-09-09 | End: 2019-09-19

## 2019-09-09 RX ORDER — ALBUTEROL SULFATE 2.5 MG/3ML
2.5 SOLUTION RESPIRATORY (INHALATION) EVERY 4 HOURS PRN
Qty: 120 ML | Refills: 3 | Status: SHIPPED | OUTPATIENT
Start: 2019-09-09 | End: 2020-03-09

## 2019-09-09 RX ORDER — AZITHROMYCIN 250 MG/1
250 TABLET, FILM COATED ORAL DAILY
Qty: 5 TABLET | Refills: 0 | Status: SHIPPED | OUTPATIENT
Start: 2019-09-09 | End: 2019-09-14

## 2019-09-09 RX ORDER — LACTOBACILLUS RHAMNOSUS GG 10B CELL
1 CAPSULE ORAL
Status: DISCONTINUED | OUTPATIENT
Start: 2019-09-09 | End: 2019-09-09 | Stop reason: HOSPADM

## 2019-09-09 RX ORDER — METOPROLOL SUCCINATE 25 MG/1
25 TABLET, EXTENDED RELEASE ORAL DAILY
Status: DISCONTINUED | OUTPATIENT
Start: 2019-09-09 | End: 2019-09-09 | Stop reason: HOSPADM

## 2019-09-09 RX ORDER — METOPROLOL SUCCINATE 25 MG/1
25 TABLET, EXTENDED RELEASE ORAL DAILY
Qty: 30 TABLET | Refills: 1 | Status: SHIPPED | OUTPATIENT
Start: 2019-09-09 | End: 2019-09-09

## 2019-09-09 RX ADMIN — AMLODIPINE BESYLATE 2.5 MG: 2.5 TABLET ORAL at 09:30

## 2019-09-09 RX ADMIN — IPRATROPIUM BROMIDE AND ALBUTEROL SULFATE 1 AMPULE: .5; 3 SOLUTION RESPIRATORY (INHALATION) at 04:09

## 2019-09-09 RX ADMIN — ENOXAPARIN SODIUM 40 MG: 40 INJECTION SUBCUTANEOUS at 09:35

## 2019-09-09 RX ADMIN — PANTOPRAZOLE SODIUM 40 MG: 40 TABLET, DELAYED RELEASE ORAL at 09:34

## 2019-09-09 RX ADMIN — IPRATROPIUM BROMIDE AND ALBUTEROL SULFATE 1 AMPULE: .5; 3 SOLUTION RESPIRATORY (INHALATION) at 00:22

## 2019-09-09 RX ADMIN — METOPROLOL SUCCINATE 25 MG: 25 TABLET, EXTENDED RELEASE ORAL at 09:34

## 2019-09-09 RX ADMIN — BUDESONIDE 500 MCG: 0.5 SUSPENSION RESPIRATORY (INHALATION) at 08:11

## 2019-09-09 RX ADMIN — FORMOTEROL FUMARATE DIHYDRATE 20 MCG: 20 SOLUTION RESPIRATORY (INHALATION) at 08:11

## 2019-09-09 RX ADMIN — Medication 1 CAPSULE: at 09:34

## 2019-09-09 RX ADMIN — IPRATROPIUM BROMIDE AND ALBUTEROL SULFATE 1 AMPULE: .5; 3 SOLUTION RESPIRATORY (INHALATION) at 08:11

## 2019-09-09 RX ADMIN — INSULIN LISPRO 2 UNITS: 100 INJECTION, SOLUTION INTRAVENOUS; SUBCUTANEOUS at 09:35

## 2019-09-09 ASSESSMENT — PAIN SCALES - GENERAL: PAINLEVEL_OUTOF10: 0

## 2019-09-09 NOTE — PROGRESS NOTES
Patient bed malfunction and the bed alarm would not work. Had patient stand up and tried to zero bed. Still the alarm function would not work. Advised patient we need  to switch beds. Patient refused to switch to a bed with a working alarm. . Instructed patient to call if she need to get up. Patient verbally agreed in understanding fall education and to use the call light. Will continue to monitor. Luis Enrique Jha
Pattern: Regular Pattern; RR 8-20 = 0    Breath Sounds: Absent bilaterally and/or with wheezes = 3    Sputum   ,  , Sputum How Obtained: Spontaneous cough  Cough: Strong, productive = 1    Vital Signs   BP (!) 158/81   Pulse 92   Temp 97.8 °F (36.6 °C) (Oral)   Resp 18   Ht 5' 4\" (1.626 m)   Wt 189 lb 9.5 oz (86 kg)   SpO2 96%   BMI 32.54 kg/m²   SPO2 (COPD values may differ): 86-87% on room air or greater than 92% on FiO2 35- 50% = 3    Peak Flow (asthma only): not applicable = 0    RSI: 1-91 = TID (three times daily) and Q4hr PRN for dyspnea and 11-13 = Q6H or QID and Q4HPRN for dyspnea        Plan       Goals: medication delivery, mobilize retained secretions, volume expansion and improve oxygenation    Patient/caregiver was educated on the proper method of use for Respiratory Care Devices:  Yes           Response to education:  Excellent     Is patient being placed on Home Treatment Regimen? No     Does the patient have everything they need prior to discharge? No     Comments: Pt wheezing, in mild distress on exertion    Plan of Care: duoneb q4h hhn, albuterol prn, oxygen protocol    Electronically signed by Krystin Willis RCP on 9/8/2019 at 1:11 AM    Respiratory Protocol Guidelines     1. Assessment and treatment by Respiratory Therapy will be initiated for medication and therapeutic interventions upon initiation of aerosolized medication. 2. Physician will be contacted for respiratory rate (RR) greater than 35 breaths per minute. Therapy will be held for heart rate (HR) greater than 140 beats per minute, pending direction from physician. 3. Bronchodilators will be administered via Metered Dose Inhaler (MDI) with spacer when the following criteria are met:  a. Alert and cooperative     b. HR < 140 bpm  c. RR < 30 bpm                d. Can demonstrate a 2-3 second inspiratory hold  4.  Bronchodilators will be administered via Hand Held Nebulizer [facility]) to patients when ANY of the following criteria

## 2019-09-16 ENCOUNTER — HOSPITAL ENCOUNTER (OUTPATIENT)
Dept: PULMONOLOGY | Age: 84
Discharge: HOME | End: 2019-09-16
Payer: MEDICARE

## 2019-09-16 RX ORDER — ALBUTEROL SULFATE 2.5 MG/3ML
2.5 SOLUTION RESPIRATORY (INHALATION) ONCE
Status: DISCONTINUED | OUTPATIENT
Start: 2019-09-16 | End: 2019-09-19 | Stop reason: HOSPADM

## 2019-10-26 DIAGNOSIS — J44.9 ASTHMA WITH COPD (HCC): ICD-10-CM

## 2019-10-28 RX ORDER — ALBUTEROL SULFATE 2.5 MG/3ML
SOLUTION RESPIRATORY (INHALATION)
Refills: 3 | OUTPATIENT
Start: 2019-10-28

## 2020-03-09 RX ORDER — ALBUTEROL SULFATE 2.5 MG/3ML
SOLUTION RESPIRATORY (INHALATION)
Qty: 600 ML | Refills: 0 | Status: SHIPPED | OUTPATIENT
Start: 2020-03-09

## 2020-10-01 ENCOUNTER — APPOINTMENT (OUTPATIENT)
Dept: GENERAL RADIOLOGY | Age: 85
DRG: 282 | End: 2020-10-01
Payer: MEDICARE

## 2020-10-01 ENCOUNTER — HOSPITAL ENCOUNTER (INPATIENT)
Age: 85
LOS: 7 days | Discharge: NURSING, REHAB OR LONG-TERM CARE | DRG: 282 | End: 2020-10-08
Attending: EMERGENCY MEDICINE
Payer: MEDICARE

## 2020-10-01 ENCOUNTER — APPOINTMENT (OUTPATIENT)
Dept: CT IMAGING | Age: 85
DRG: 282 | End: 2020-10-01
Payer: MEDICARE

## 2020-10-01 PROBLEM — I21.4 NSTEMI (NON-ST ELEVATED MYOCARDIAL INFARCTION) (HCC): Status: ACTIVE | Noted: 2020-10-01

## 2020-10-01 LAB
ANION GAP SERPL CALCULATED.3IONS-SCNC: 9 MMOL/L (ref 3–16)
ANTI-XA UNFRAC HEPARIN: 0.69 IU/ML (ref 0.3–0.7)
APTT: 21.9 SEC (ref 24.2–36.2)
BACTERIA: ABNORMAL /HPF
BASE EXCESS VENOUS: 6.2 MMOL/L (ref -2–3)
BASOPHILS ABSOLUTE: 0 K/UL (ref 0–0.2)
BASOPHILS RELATIVE PERCENT: 0.4 %
BILIRUBIN URINE: NEGATIVE
BLOOD, URINE: ABNORMAL
BUN BLDV-MCNC: 26 MG/DL (ref 7–20)
CALCIUM SERPL-MCNC: 9.4 MG/DL (ref 8.3–10.6)
CARBOXYHEMOGLOBIN: 1.6 % (ref 0–1.5)
CHLORIDE BLD-SCNC: 98 MMOL/L (ref 99–110)
CLARITY: CLEAR
CO2: 31 MMOL/L (ref 21–32)
COLOR: YELLOW
CREAT SERPL-MCNC: 1 MG/DL (ref 0.6–1.2)
EKG ATRIAL RATE: 77 BPM
EKG DIAGNOSIS: NORMAL
EKG P AXIS: 82 DEGREES
EKG P-R INTERVAL: 188 MS
EKG Q-T INTERVAL: 386 MS
EKG QRS DURATION: 78 MS
EKG QTC CALCULATION (BAZETT): 436 MS
EKG R AXIS: 58 DEGREES
EKG T AXIS: 58 DEGREES
EKG VENTRICULAR RATE: 77 BPM
EOSINOPHILS ABSOLUTE: 0.2 K/UL (ref 0–0.6)
EOSINOPHILS RELATIVE PERCENT: 4.1 %
EPITHELIAL CELLS, UA: ABNORMAL /HPF (ref 0–5)
GFR AFRICAN AMERICAN: >60
GFR NON-AFRICAN AMERICAN: 53
GLUCOSE BLD-MCNC: 166 MG/DL (ref 70–99)
GLUCOSE BLD-MCNC: 182 MG/DL (ref 70–99)
GLUCOSE URINE: NEGATIVE MG/DL
HCO3 VENOUS: 32.5 MMOL/L (ref 24–28)
HCT VFR BLD CALC: 29.7 % (ref 36–48)
HCT VFR BLD CALC: 31 % (ref 36–48)
HCT VFR BLD CALC: 32 % (ref 36–48)
HEMOGLOBIN, VEN, REDUCED: 57.8 %
HEMOGLOBIN: 10.1 G/DL (ref 12–16)
HEMOGLOBIN: 9.5 G/DL (ref 12–16)
HEMOGLOBIN: 9.8 G/DL (ref 12–16)
INR BLD: 0.93 (ref 0.86–1.14)
KETONES, URINE: NEGATIVE MG/DL
LEUKOCYTE ESTERASE, URINE: NEGATIVE
LYMPHOCYTES ABSOLUTE: 1.1 K/UL (ref 1–5.1)
LYMPHOCYTES RELATIVE PERCENT: 28.2 %
MCH RBC QN AUTO: 26.2 PG (ref 26–34)
MCH RBC QN AUTO: 26.3 PG (ref 26–34)
MCHC RBC AUTO-ENTMCNC: 31.6 G/DL (ref 31–36)
MCHC RBC AUTO-ENTMCNC: 31.7 G/DL (ref 31–36)
MCV RBC AUTO: 82.8 FL (ref 80–100)
MCV RBC AUTO: 83.1 FL (ref 80–100)
METHEMOGLOBIN VENOUS: 0.7 % (ref 0–1.5)
MICROSCOPIC EXAMINATION: YES
MONOCYTES ABSOLUTE: 0.4 K/UL (ref 0–1.3)
MONOCYTES RELATIVE PERCENT: 10.7 %
NEUTROPHILS ABSOLUTE: 2.2 K/UL (ref 1.7–7.7)
NEUTROPHILS RELATIVE PERCENT: 56.6 %
NITRITE, URINE: NEGATIVE
O2 SAT, VEN: 41 %
PCO2, VEN: 60.6 MMHG (ref 41–51)
PDW BLD-RTO: 15.4 % (ref 12.4–15.4)
PDW BLD-RTO: 15.7 % (ref 12.4–15.4)
PERFORMED ON: ABNORMAL
PH UA: 7.5 (ref 5–8)
PH VENOUS: 7.35 (ref 7.35–7.45)
PLATELET # BLD: 231 K/UL (ref 135–450)
PLATELET # BLD: 285 K/UL (ref 135–450)
PLATELET SLIDE REVIEW: ADEQUATE
PMV BLD AUTO: 7 FL (ref 5–10.5)
PMV BLD AUTO: 7.8 FL (ref 5–10.5)
PO2, VEN: 28.2 MMHG (ref 25–40)
POTASSIUM REFLEX MAGNESIUM: 4.1 MMOL/L (ref 3.5–5.1)
PRO-BNP: 275 PG/ML (ref 0–449)
PROTEIN UA: 30 MG/DL
PROTHROMBIN TIME: 10.8 SEC (ref 10–13.2)
RBC # BLD: 3.74 M/UL (ref 4–5.2)
RBC # BLD: 3.85 M/UL (ref 4–5.2)
RBC UA: ABNORMAL /HPF (ref 0–4)
SLIDE REVIEW: ABNORMAL
SODIUM BLD-SCNC: 138 MMOL/L (ref 136–145)
SPECIFIC GRAVITY UA: 1.02 (ref 1–1.03)
TCO2 CALC VENOUS: 34 MMOL/L
TROPONIN: 0.22 NG/ML
TROPONIN: 0.23 NG/ML
TROPONIN: 0.24 NG/ML
URINE TYPE: ABNORMAL
UROBILINOGEN, URINE: 0.2 E.U./DL
WBC # BLD: 3.9 K/UL (ref 4–11)
WBC # BLD: 9.2 K/UL (ref 4–11)
WBC UA: ABNORMAL /HPF (ref 0–5)

## 2020-10-01 PROCEDURE — 85014 HEMATOCRIT: CPT

## 2020-10-01 PROCEDURE — 70450 CT HEAD/BRAIN W/O DYE: CPT

## 2020-10-01 PROCEDURE — 82607 VITAMIN B-12: CPT

## 2020-10-01 PROCEDURE — 83880 ASSAY OF NATRIURETIC PEPTIDE: CPT

## 2020-10-01 PROCEDURE — 85025 COMPLETE CBC W/AUTO DIFF WBC: CPT

## 2020-10-01 PROCEDURE — 99285 EMERGENCY DEPT VISIT HI MDM: CPT

## 2020-10-01 PROCEDURE — 82803 BLOOD GASES ANY COMBINATION: CPT

## 2020-10-01 PROCEDURE — 85520 HEPARIN ASSAY: CPT

## 2020-10-01 PROCEDURE — 36415 COLL VENOUS BLD VENIPUNCTURE: CPT

## 2020-10-01 PROCEDURE — 85730 THROMBOPLASTIN TIME PARTIAL: CPT

## 2020-10-01 PROCEDURE — 81001 URINALYSIS AUTO W/SCOPE: CPT

## 2020-10-01 PROCEDURE — 83540 ASSAY OF IRON: CPT

## 2020-10-01 PROCEDURE — 82746 ASSAY OF FOLIC ACID SERUM: CPT

## 2020-10-01 PROCEDURE — 84484 ASSAY OF TROPONIN QUANT: CPT

## 2020-10-01 PROCEDURE — 93005 ELECTROCARDIOGRAM TRACING: CPT | Performed by: EMERGENCY MEDICINE

## 2020-10-01 PROCEDURE — 6370000000 HC RX 637 (ALT 250 FOR IP): Performed by: INTERNAL MEDICINE

## 2020-10-01 PROCEDURE — 6360000002 HC RX W HCPCS: Performed by: EMERGENCY MEDICINE

## 2020-10-01 PROCEDURE — 94640 AIRWAY INHALATION TREATMENT: CPT

## 2020-10-01 PROCEDURE — 6360000002 HC RX W HCPCS: Performed by: INTERNAL MEDICINE

## 2020-10-01 PROCEDURE — 85027 COMPLETE CBC AUTOMATED: CPT

## 2020-10-01 PROCEDURE — 85018 HEMOGLOBIN: CPT

## 2020-10-01 PROCEDURE — 6370000000 HC RX 637 (ALT 250 FOR IP): Performed by: EMERGENCY MEDICINE

## 2020-10-01 PROCEDURE — 2700000000 HC OXYGEN THERAPY PER DAY

## 2020-10-01 PROCEDURE — 94761 N-INVAS EAR/PLS OXIMETRY MLT: CPT

## 2020-10-01 PROCEDURE — 71045 X-RAY EXAM CHEST 1 VIEW: CPT

## 2020-10-01 PROCEDURE — 83550 IRON BINDING TEST: CPT

## 2020-10-01 PROCEDURE — 85610 PROTHROMBIN TIME: CPT

## 2020-10-01 PROCEDURE — 80048 BASIC METABOLIC PNL TOTAL CA: CPT

## 2020-10-01 PROCEDURE — 1200000000 HC SEMI PRIVATE

## 2020-10-01 RX ORDER — ACETAMINOPHEN 325 MG/1
650 TABLET ORAL EVERY 6 HOURS PRN
Status: DISCONTINUED | OUTPATIENT
Start: 2020-10-01 | End: 2020-10-04 | Stop reason: SDUPTHER

## 2020-10-01 RX ORDER — LIDOCAINE 4 G/G
1 PATCH TOPICAL DAILY
Status: DISCONTINUED | OUTPATIENT
Start: 2020-10-01 | End: 2020-10-08 | Stop reason: HOSPADM

## 2020-10-01 RX ORDER — CYCLOBENZAPRINE HCL 10 MG
5 TABLET ORAL 2 TIMES DAILY PRN
Status: DISCONTINUED | OUTPATIENT
Start: 2020-10-01 | End: 2020-10-08 | Stop reason: HOSPADM

## 2020-10-01 RX ORDER — FUROSEMIDE 10 MG/ML
20 INJECTION INTRAMUSCULAR; INTRAVENOUS ONCE
Status: COMPLETED | OUTPATIENT
Start: 2020-10-01 | End: 2020-10-01

## 2020-10-01 RX ORDER — SODIUM CHLORIDE 0.9 % (FLUSH) 0.9 %
10 SYRINGE (ML) INJECTION EVERY 12 HOURS SCHEDULED
Status: DISCONTINUED | OUTPATIENT
Start: 2020-10-01 | End: 2020-10-08 | Stop reason: HOSPADM

## 2020-10-01 RX ORDER — ALBUTEROL SULFATE 2.5 MG/3ML
2.5 SOLUTION RESPIRATORY (INHALATION) 4 TIMES DAILY
Status: DISCONTINUED | OUTPATIENT
Start: 2020-10-01 | End: 2020-10-08 | Stop reason: HOSPADM

## 2020-10-01 RX ORDER — BUDESONIDE AND FORMOTEROL FUMARATE DIHYDRATE 80; 4.5 UG/1; UG/1
2 AEROSOL RESPIRATORY (INHALATION) 2 TIMES DAILY
Status: DISCONTINUED | OUTPATIENT
Start: 2020-10-01 | End: 2020-10-01 | Stop reason: SDUPTHER

## 2020-10-01 RX ORDER — ALBUTEROL SULFATE 2.5 MG/3ML
2.5 SOLUTION RESPIRATORY (INHALATION) EVERY 4 HOURS PRN
Status: DISCONTINUED | OUTPATIENT
Start: 2020-10-01 | End: 2020-10-08 | Stop reason: HOSPADM

## 2020-10-01 RX ORDER — POTASSIUM CHLORIDE 20 MEQ/1
20 TABLET, EXTENDED RELEASE ORAL EVERY OTHER DAY
Status: DISCONTINUED | OUTPATIENT
Start: 2020-10-02 | End: 2020-10-08 | Stop reason: HOSPADM

## 2020-10-01 RX ORDER — BUDESONIDE 0.25 MG/2ML
0.25 INHALANT ORAL 2 TIMES DAILY
Status: DISCONTINUED | OUTPATIENT
Start: 2020-10-01 | End: 2020-10-08 | Stop reason: HOSPADM

## 2020-10-01 RX ORDER — ACETAMINOPHEN 650 MG/1
650 SUPPOSITORY RECTAL EVERY 6 HOURS PRN
Status: DISCONTINUED | OUTPATIENT
Start: 2020-10-01 | End: 2020-10-04 | Stop reason: SDUPTHER

## 2020-10-01 RX ORDER — HEPARIN SODIUM 1000 [USP'U]/ML
2000 INJECTION, SOLUTION INTRAVENOUS; SUBCUTANEOUS PRN
Status: DISCONTINUED | OUTPATIENT
Start: 2020-10-01 | End: 2020-10-04

## 2020-10-01 RX ORDER — HEPARIN SODIUM 10000 [USP'U]/100ML
6 INJECTION, SOLUTION INTRAVENOUS CONTINUOUS
Status: DISCONTINUED | OUTPATIENT
Start: 2020-10-01 | End: 2020-10-04

## 2020-10-01 RX ORDER — SODIUM CHLORIDE 0.9 % (FLUSH) 0.9 %
10 SYRINGE (ML) INJECTION PRN
Status: DISCONTINUED | OUTPATIENT
Start: 2020-10-01 | End: 2020-10-08 | Stop reason: HOSPADM

## 2020-10-01 RX ORDER — ASPIRIN 81 MG/1
324 TABLET, CHEWABLE ORAL ONCE
Status: DISCONTINUED | OUTPATIENT
Start: 2020-10-01 | End: 2020-10-08 | Stop reason: HOSPADM

## 2020-10-01 RX ORDER — BETAXOLOL 10 MG/1
10 TABLET, FILM COATED ORAL 2 TIMES DAILY
Status: ON HOLD | COMMUNITY
End: 2020-10-08 | Stop reason: HOSPADM

## 2020-10-01 RX ORDER — ONDANSETRON 2 MG/ML
4 INJECTION INTRAMUSCULAR; INTRAVENOUS EVERY 6 HOURS PRN
Status: DISCONTINUED | OUTPATIENT
Start: 2020-10-01 | End: 2020-10-08 | Stop reason: HOSPADM

## 2020-10-01 RX ORDER — METOPROLOL SUCCINATE 25 MG/1
25 TABLET, EXTENDED RELEASE ORAL DAILY
Status: DISCONTINUED | OUTPATIENT
Start: 2020-10-01 | End: 2020-10-08 | Stop reason: HOSPADM

## 2020-10-01 RX ORDER — IPRATROPIUM BROMIDE AND ALBUTEROL SULFATE 2.5; .5 MG/3ML; MG/3ML
1 SOLUTION RESPIRATORY (INHALATION)
Status: DISCONTINUED | OUTPATIENT
Start: 2020-10-01 | End: 2020-10-01

## 2020-10-01 RX ORDER — POLYETHYLENE GLYCOL 3350 17 G/17G
17 POWDER, FOR SOLUTION ORAL DAILY PRN
Status: DISCONTINUED | OUTPATIENT
Start: 2020-10-01 | End: 2020-10-08 | Stop reason: HOSPADM

## 2020-10-01 RX ORDER — HEPARIN SODIUM 1000 [USP'U]/ML
4000 INJECTION, SOLUTION INTRAVENOUS; SUBCUTANEOUS ONCE
Status: COMPLETED | OUTPATIENT
Start: 2020-10-01 | End: 2020-10-01

## 2020-10-01 RX ORDER — NICOTINE POLACRILEX 4 MG
15 LOZENGE BUCCAL PRN
Status: DISCONTINUED | OUTPATIENT
Start: 2020-10-01 | End: 2020-10-08 | Stop reason: HOSPADM

## 2020-10-01 RX ORDER — DEXTROSE MONOHYDRATE 25 G/50ML
12.5 INJECTION, SOLUTION INTRAVENOUS PRN
Status: DISCONTINUED | OUTPATIENT
Start: 2020-10-01 | End: 2020-10-08 | Stop reason: HOSPADM

## 2020-10-01 RX ORDER — PROMETHAZINE HYDROCHLORIDE 12.5 MG/1
12.5 TABLET ORAL EVERY 6 HOURS PRN
Status: DISCONTINUED | OUTPATIENT
Start: 2020-10-01 | End: 2020-10-08 | Stop reason: HOSPADM

## 2020-10-01 RX ORDER — MECLIZINE HYDROCHLORIDE 25 MG/1
25 TABLET ORAL 2 TIMES DAILY PRN
Status: DISCONTINUED | OUTPATIENT
Start: 2020-10-01 | End: 2020-10-04

## 2020-10-01 RX ORDER — ARFORMOTEROL TARTRATE 15 UG/2ML
15 SOLUTION RESPIRATORY (INHALATION) 2 TIMES DAILY
Status: DISCONTINUED | OUTPATIENT
Start: 2020-10-01 | End: 2020-10-08 | Stop reason: HOSPADM

## 2020-10-01 RX ORDER — ONDANSETRON 2 MG/ML
4 INJECTION INTRAMUSCULAR; INTRAVENOUS ONCE
Status: COMPLETED | OUTPATIENT
Start: 2020-10-01 | End: 2020-10-01

## 2020-10-01 RX ORDER — PANTOPRAZOLE SODIUM 40 MG/1
40 TABLET, DELAYED RELEASE ORAL
Status: DISCONTINUED | OUTPATIENT
Start: 2020-10-02 | End: 2020-10-03 | Stop reason: SDUPTHER

## 2020-10-01 RX ORDER — INSULIN LISPRO 100 [IU]/ML
0-3 INJECTION, SOLUTION INTRAVENOUS; SUBCUTANEOUS NIGHTLY
Status: DISCONTINUED | OUTPATIENT
Start: 2020-10-01 | End: 2020-10-08 | Stop reason: HOSPADM

## 2020-10-01 RX ORDER — HEPARIN SODIUM 1000 [USP'U]/ML
4000 INJECTION, SOLUTION INTRAVENOUS; SUBCUTANEOUS PRN
Status: DISCONTINUED | OUTPATIENT
Start: 2020-10-01 | End: 2020-10-04

## 2020-10-01 RX ORDER — DEXTROSE MONOHYDRATE 50 MG/ML
100 INJECTION, SOLUTION INTRAVENOUS PRN
Status: DISCONTINUED | OUTPATIENT
Start: 2020-10-01 | End: 2020-10-08 | Stop reason: HOSPADM

## 2020-10-01 RX ORDER — INSULIN LISPRO 100 [IU]/ML
0-6 INJECTION, SOLUTION INTRAVENOUS; SUBCUTANEOUS
Status: DISCONTINUED | OUTPATIENT
Start: 2020-10-02 | End: 2020-10-08 | Stop reason: HOSPADM

## 2020-10-01 RX ADMIN — IPRATROPIUM BROMIDE AND ALBUTEROL SULFATE 1 AMPULE: .5; 3 SOLUTION RESPIRATORY (INHALATION) at 15:59

## 2020-10-01 RX ADMIN — ONDANSETRON 4 MG: 2 INJECTION INTRAMUSCULAR; INTRAVENOUS at 16:02

## 2020-10-01 RX ADMIN — HEPARIN SODIUM 4000 UNITS: 1000 INJECTION, SOLUTION INTRAVENOUS; SUBCUTANEOUS at 16:14

## 2020-10-01 RX ADMIN — BUDESONIDE 250 MCG: 0.25 SUSPENSION RESPIRATORY (INHALATION) at 22:31

## 2020-10-01 RX ADMIN — ARFORMOTEROL TARTRATE 15 MCG: 15 SOLUTION RESPIRATORY (INHALATION) at 22:31

## 2020-10-01 RX ADMIN — HEPARIN SODIUM 11 UNITS/KG/HR: 10000 INJECTION, SOLUTION INTRAVENOUS at 16:14

## 2020-10-01 RX ADMIN — FUROSEMIDE 20 MG: 10 INJECTION, SOLUTION INTRAVENOUS at 16:02

## 2020-10-01 RX ADMIN — ALBUTEROL SULFATE 2.5 MG: 2.5 SOLUTION RESPIRATORY (INHALATION) at 22:31

## 2020-10-01 RX ADMIN — INSULIN LISPRO 1 UNITS: 100 INJECTION, SOLUTION INTRAVENOUS; SUBCUTANEOUS at 22:22

## 2020-10-01 NOTE — ED NOTES
Bed: B18-18  Expected date:   Expected time:   Means of arrival:   Comments:  [address], RN  10/01/20 0351

## 2020-10-01 NOTE — ED PROVIDER NOTES
[address] [facility]          ATTENDING PHYSICIAN NOTE       Date of evaluation: 10/1/2020    Chief Complaint     Dizziness      History of Present Illness     Barb Roberts is a 82 y.o. female with a past medical history of HTN, HLD, diabetes, vertigo on meclizine, tympanoplasty with keloid formation around left otic canal, and COPD who presents with a chief complaint of dizziness. The patient reports that she intermittently gets vertigo and it is relieved with meclizine. She reported that she felt off balance this morning after using her nebulizer. She denies falling. No headaches or vision changes. She has had no nausea or vomiting. No fevers, cough, rhinorrhea, or sore throat. No chest pain or shortness of breath. She took her meclizine, which did not relieve her symptoms. She presented to the ED for evaluation. Review of Systems     Please refer to the HPI for pertinent positive and negative review of systems. All other systems reviewed and negative unless stated in the HPI. Past Medical, Surgical, Family, and Social History     She has a past medical history of Asthma, COPD exacerbation ([address] Diabetes mellitus ([facility] [address] Hyperlipidemia, and Hypertension. She has a past surgical history that includes brain surgery; back surgery; Hysterectomy; and Tympanoplasty. Her family history is not on file. She reports that she has been smoking cigarettes. She has been smoking about 0.25 packs per day. She has never used smokeless tobacco. She reports that she does not drink alcohol or use drugs. Medications     Previous Medications    ALBUTEROL (PROVENTIL HFA;VENTOLIN HFA) 108 (90 BASE) MCG/ACT INHALER    Inhale 2 puffs into the lungs every 6 hours as needed.     ALBUTEROL (PROVENTIL) (2.5 MG/3ML) 0.083% NEBULIZER SOLUTION    USE 1 VIAL IN NEBULIZER EVERY 4 HOURS AS NEEDED FOR WHEEZING    AMLODIPINE (NORVASC) 2.5 MG TABLET    Take 1 tablet by mouth 2 times daily    BLOOD extremities. SILT in the patients upper and lower extremities. Finger to nose normal.    Psych:  Normal Mood        Diagnostic Results     EKG   Sinus rhythm at a rate of 77 bpm with PVCs. . QRS 78. QTc 436. Normal axis. No ST segment elevation or depression. No STEMI. RADIOLOGY:  XR CHEST PORTABLE   Final Result      No acute disease. CT HEAD WO CONTRAST   Final Result      No acute intracranial abnormality.               LABS:   Results for orders placed or performed during the hospital encounter of 10/01/20   CBC auto differential   Result Value Ref Range    WBC 3.9 (L) 4.0 - 11.0 K/uL    RBC 3.74 (L) 4.00 - 5.20 M/uL    Hemoglobin 9.8 (L) 12.0 - 16.0 g/dL    Hematocrit 31.0 (L) 36.0 - 48.0 %    MCV 82.8 80.0 - 100.0 fL    MCH 26.2 26.0 - 34.0 pg    MCHC 31.6 31.0 - 36.0 g/dL    RDW 15.4 12.4 - 15.4 %    Platelets 653 504 - 289 K/uL    MPV 7.0 5.0 - 10.5 fL    Neutrophils % 56.6 %    Lymphocytes % 28.2 %    Monocytes % 10.7 %    Eosinophils % 4.1 %    Basophils % 0.4 %    Neutrophils Absolute 2.2 1.7 - 7.7 K/uL    Lymphocytes Absolute 1.1 1.0 - 5.1 K/uL    Monocytes Absolute 0.4 0.0 - 1.3 K/uL    Eosinophils Absolute 0.2 0.0 - 0.6 K/uL    Basophils Absolute 0.0 0.0 - 0.2 K/uL   Troponin (lab)   Result Value Ref Range    Troponin 0.24 (H) <0.01 ng/mL   Blood gas, venous (Lab)   Result Value Ref Range    pH, [place]on 7.350 7.350 - 7.450    pCO2, [place] 60.6 (H) 41.0 - 51.0 mmHg    pO2, [place] 28.2 25.0 - 40.0 mmHg    HCO3, Venous 32.5 (H) 24.0 - 28.0 mmol/L    Base Excess, [place] 6.2 (H) -2.0 - 3.0 mmol/L    O2 Sat, [place] 41 Not established %    Carboxyhemoglobin 1.6 (H) 0.0 - 1.5 %    MetHgb, [place] 0.7 0.0 - 1.5 %    TC02 (Calc), [place] 34 mmol/L    Hemoglobin, [place], Reduced 57.80 %   Urinalysis, reflex to microscopic (Lab)   Result Value Ref Range    Color, UA Yellow Straw/Yellow    Clarity, UA Clear Clear    Glucose, Ur Negative Negative mg/dL    Bilirubin Urine Negative Negative    Ketones, Urine Negative performed in ER and to be interpreted by ER physician. Confirmed by MD, NICANOR ([id number]),  Edna Thomas ([address] [facility] [address]), PACHECO ([id number]) on 10/1/2020 1:39:08 PM       RECENT VITALS:  BP: (!) 119/48, Temp: 98.5 °F (36.9 °C), Pulse: 70, Resp: 18, SpO2: 98 %     ED Course     Nursing Notes, Past Medical Hx, Past Surgical Hx, Social Hx, Allergies, and Family Hx were reviewed. The patient was given the following medications:  Orders Placed This Encounter   Medications    aspirin chewable tablet 324 mg    ondansetron (ZOFRAN) injection 4 mg    furosemide (LASIX) injection 20 mg    heparin (porcine) injection 4,000 Units    heparin (porcine) injection 4,000 Units    heparin (porcine) injection 2,000 Units    heparin 25,000 units in dextrose 5% 250 mL infusion    ipratropium-albuterol (DUONEB) nebulizer solution 1 ampule       CONSULTS:  IP CONSULT TO HOSPITALIST  IP CONSULT TO PHARMACY  IP CONSULT TO [address] [place] [address] / ELDA / Yue Brissa is a 82 y.o. female who presented to the ED with complaint of vertigo. On my evaluation she was well appearing in no acute distress with a nonfocal neurologic exam.  She had no dysmetria. She reported that her vertigo symptoms were improving. I had low suspicion that this was a central vertigo. CT scan of her head showed no acute findings. CXR showed no acute disease. Her EKG was nonischemic, but she did have an elevated troponin of 0.24 in the presence of normal kidney function. Low suspicion for PE or dissection. At this time I think she requires further work-up for possible ACS and she was given ASA, furosemide, and a heparin infusion was ordered. She will be admitted to the hospitalist for further management in stable condition. Clinical Impression     1. NSTEMI (non-ST elevated myocardial infarction) ([facility] [address]        Disposition     PATIENT REFERRED TO:  No follow-up provider specified.     DISCHARGE MEDICATIONS:  New Prescriptions    No medications on file       DISPOSITION            Justina Mendoza MD  10/01/20 [id number]

## 2020-10-01 NOTE — ED TRIAGE NOTES
Pt from home with dizziness that started this morning. She has a history of multiple ear surgeries and brain surgery. On initial assessment she does have chronic pain in bilateral shoulders. She also has bed bugs. Environmental precautions initiated. Pt's belongings into bag.

## 2020-10-01 NOTE — ED NOTES
Pt requesting food. A box lunch was provided with soft drinks.      Jose Ramon Multani, FREDDY  10/01/20 [id number]

## 2020-10-01 NOTE — H&P
Hospital Medicine History & Physical      PCP: Hannah Johnson MD    Date of Admission: 10/1/2020    Date of Service: Pt seen/examined on 10/1/2020 and Admitted to Inpatient with expected LOS greater than two midnights due to medical therapy. Chief Complaint:  Dizziness, possible syncope      History Of Present Illness: The patient is a 82 y.o. female with medical history of HTN, asthma, DM, chronic vertigo, who presents to [facility] with severe dizziness today. Patient was in her usual state of health yesterday. She had a restful night. This morning she was doing her nebulizer treatment and either fell asleep or passed out, she is not sure. She remembers waking up and nebiluzer fell out of her hands. She got up to use the bathroom and became very dizzy. She describes dizziness as she was about to pass out. Denies any chest pain or palpitations at that time. She took her meclizine and called EMS. Work up in [place] [id number] so far significant for troponin 0.24 without acute EKG changes, normal pro BNP, and new anemia with Hgb 9.8. patient denies any recent bleeding, reports brown stool and last colonoscopy was many years ago. Head CT was non acute. She was given aspirin, lasix and being started on heparin drip. Past Medical History:        Diagnosis Date    Asthma     COPD exacerbation ([facility] [address]     Diabetes mellitus ([facility] [address]     Hyperlipidemia     Hypertension        Past Surgical History:        Procedure Laterality Date    BACK SURGERY      BRAIN SURGERY      HYSTERECTOMY      TYMPANOPLASTY         Medications Prior to Admission:    Prior to Admission medications    Medication Sig Start Date End Date Taking?  Authorizing Provider   albuterol (PROVENTIL) (2.5 MG/3ML) 0.083% nebulizer solution USE 1 VIAL IN NEBULIZER EVERY 4 HOURS AS NEEDED FOR WHEEZING 3/9/20   Adelso Kwan MD   mometasone-formoterol (DULERA) 100-5 MCG/ACT inhaler Inhale 2 puffs into the lungs 2 times daily 9/9/19   Isaias Gamaliel Carlos MD   metoprolol succinate (TOPROL XL) 25 MG extended release tablet Take 1 tablet by mouth daily 9/9/19   Marbella Carrera MD   blood glucose monitor strips 1 strip by Other route Test 2 times a day & as needed for symptoms of irregular blood glucose. Historical Provider, MD   metFORMIN (GLUCOPHAGE) 500 MG tablet Take 1 tablet by mouth daily (with breakfast) 3/5/19   Adelso Kwan MD   cyclobenzaprine (FLEXERIL) 5 MG tablet Take 1 tablet by mouth 2 times daily as needed for Muscle spasms 2/7/19   Marcos Omalley MD   lidocaine (LIDODERM) 5 % Place 1 patch onto the skin daily 12 hours on, 12 hours off. 10/5/18   Sha Andrade MD   potassium chloride (KLOR-CON M) 20 MEQ extended release tablet Take 1 tablet by mouth every other day 10/24/17   Delfino Yi DO   amLODIPine (NORVASC) 2.5 MG tablet Take 1 tablet by mouth 2 times daily 10/24/17   Delfino Yi DO   albuterol (PROVENTIL HFA;VENTOLIN HFA) 108 (90 BASE) MCG/ACT inhaler Inhale 2 puffs into the lungs every 6 hours as needed. Historical Provider, MD   meclizine (ANTIVERT) 25 MG tablet Take 25 mg by mouth daily. Historical Provider, MD   ranitidine (ZANTAC) 300 MG tablet Take 300 mg by mouth nightly. Historical Provider, MD   hydrochlorothiazide (HYDRODIURIL) 25 MG tablet Take 25 mg by mouth daily. Historical Provider, MD       Allergies:  Ampicillin; Cephalexin; Clonidine derivatives; Erythromycin; Flovent [fluticasone]; Lactose; Lisinopril; Losartan; Nsaids; Simvastatin; Sulfa antibiotics; and Atenolol    Social History:  The patient currently lives at home. TOBACCO:   reports that she has been smoking cigarettes. She has been smoking about 0.25 packs per day. She has never used smokeless tobacco.  ETOH:   reports no history of alcohol use. Family History:  Reviewed in detail and negative for DM, Early CAD, Cancer, CVA. Positive as follows:    History reviewed. No pertinent family history.     REVIEW OF SYSTEMS:   Positive and negative as noted in the HPI. All other systems reviewed and negative. PHYSICAL EXAM:    BP (!) 155/59   Pulse 77   Temp 98.5 °F (36.9 °C) (Oral)   Resp 22   Ht 5' 5\" (1.651 m)   Wt 198 lb (89.8 kg)   SpO2 94%   BMI 32.95 kg/m²     General appearance: mild resp distress appears stated age and cooperative. HEENT Normal cephalic, atraumatic without obvious deformity. Conjunctivae/corneas clear. Neck: Supple, No jugular venous distention/bruits. Trachea midline without thyromegaly or adenopathy with full range of motion. Lungs: difficult to examine, but grossly diminished without wheezing or crackles  Heart: Regular rate and rhythm with Normal S1/S2 without murmurs, rubs or gallops, point of maximum impulse non-displaced  Abdomen: Soft, non-tender or non-distended without rigidity or guarding and positive bowel sounds all four quadrants. Extremities: No clubbing, cyanosis, 1+ pitting edema bilaterally. Skin: Skin color, texture, turgor normal.    Neurologic: Alert and oriented X 3,  grossly non-focal.  Mental status: Alert, oriented, thought content appropriate. Capillary refill is brisk  Peripheral pulses 2+    CXR:  I have reviewed the CXR with the following interpretation: no consolidation or effusion, hyperinflated lungs  EKG:  I have reviewed the EKG with the following interpretation: sinus without acute ST TW changes    CBC   Recent Labs     10/01/20  1348   WBC 3.9*   HGB 9.8*   HCT 31.0*         RENAL  Recent Labs     10/01/20  1348      K 4.1   CL 98*   CO2 31   BUN 26*   CREATININE 1.0     LFT'S  No results for input(s): AST, ALT, ALB, BILIDIR, BILITOT, ALKPHOS in the last 72 hours.   COAG  Recent Labs     10/01/20  1543   INR 0.93     CARDIAC ENZYMES  Recent Labs     10/01/20  1348 10/01/20  1543   TROPONINI 0.24* 0.23*       U/A:    Lab Results   Component Value Date    COLORU Yellow 10/01/2020    WBCUA 0-2 10/01/2020    RBCUA 0-2 10/01/2020    BACTERIA 2+ 10/01/2020 CLARITYU Clear 10/01/2020    SPECGRAV 1.020 10/01/2020    LEUKOCYTESUR Negative 10/01/2020    BLOODU TRACE-INTACT 10/01/2020    GLUCOSEU Negative 10/01/2020       ABG  No results found for: EMA1OKH, BEART, K2SFLQUZ, PHART, THGBART, OWA7BRW, PO2ART, RCC5FZK        Active Hospital Problems    Diagnosis Date Noted    NSTEMI (non-ST elevated myocardial infarction) ([facility] [address] [I21.4] 10/01/2020         ASSESSMENT/PLAN:    NSTEMI:  Cont asa, statin, beta blocker, heparin drip. Will trend troponin, keep on telemetry, get ECHO and consult cardiology    Dizziness:  Sounded orthostatic by history. Will check orthostatic vitals. Anemia:  Hgb below her baseline. Will monitor h&h closely while on anticoagulation. Check FOBT, iron, folate, B12. Start prophylactically on ppi. Asthma:  Cont with bronchodilators RTC    DM type 2:  Hold metformin and place on ISS. DVT Prophylaxis: heparin drip  Diet: No diet orders on file  Code Status: Prior  PT/OT Eval Status: pending    Ismael Galloway MD    Thank you Elsa Michelle MD for the opportunity to be involved in this patient's care. If you have any questions or concerns please feel free to contact me at [phone].

## 2020-10-02 LAB
ANTI-XA UNFRAC HEPARIN: 0.33 IU/ML (ref 0.3–0.7)
ANTI-XA UNFRAC HEPARIN: 0.95 IU/ML (ref 0.3–0.7)
ANTI-XA UNFRAC HEPARIN: <0.04 IU/ML (ref 0.3–0.7)
ANTI-XA UNFRAC HEPARIN: <0.04 IU/ML (ref 0.3–0.7)
FOLATE: 8.86 NG/ML (ref 4.78–24.2)
GLUCOSE BLD-MCNC: 150 MG/DL (ref 70–99)
GLUCOSE BLD-MCNC: 160 MG/DL (ref 70–99)
GLUCOSE BLD-MCNC: 161 MG/DL (ref 70–99)
GLUCOSE BLD-MCNC: 199 MG/DL (ref 70–99)
HCT VFR BLD CALC: 27 % (ref 36–48)
HCT VFR BLD CALC: 27.1 % (ref 36–48)
HCT VFR BLD CALC: 28 % (ref 36–48)
HCT VFR BLD CALC: 31.5 % (ref 36–48)
HEMOGLOBIN: 8.6 G/DL (ref 12–16)
HEMOGLOBIN: 8.7 G/DL (ref 12–16)
HEMOGLOBIN: 9 G/DL (ref 12–16)
HEMOGLOBIN: 9.9 G/DL (ref 12–16)
IRON SATURATION: 7 % (ref 15–50)
IRON: 28 UG/DL (ref 37–145)
PERFORMED ON: ABNORMAL
TOTAL IRON BINDING CAPACITY: 386 UG/DL (ref 260–445)
TROPONIN: 0.28 NG/ML
VITAMIN B-12: 394 PG/ML (ref 211–911)

## 2020-10-02 PROCEDURE — 6370000000 HC RX 637 (ALT 250 FOR IP): Performed by: INTERNAL MEDICINE

## 2020-10-02 PROCEDURE — 1200000000 HC SEMI PRIVATE

## 2020-10-02 PROCEDURE — 97166 OT EVAL MOD COMPLEX 45 MIN: CPT

## 2020-10-02 PROCEDURE — 97530 THERAPEUTIC ACTIVITIES: CPT

## 2020-10-02 PROCEDURE — 85018 HEMOGLOBIN: CPT

## 2020-10-02 PROCEDURE — 6360000002 HC RX W HCPCS: Performed by: INTERNAL MEDICINE

## 2020-10-02 PROCEDURE — 94761 N-INVAS EAR/PLS OXIMETRY MLT: CPT

## 2020-10-02 PROCEDURE — 94640 AIRWAY INHALATION TREATMENT: CPT

## 2020-10-02 PROCEDURE — 97163 PT EVAL HIGH COMPLEX 45 MIN: CPT

## 2020-10-02 PROCEDURE — 85014 HEMATOCRIT: CPT

## 2020-10-02 PROCEDURE — 85520 HEPARIN ASSAY: CPT

## 2020-10-02 PROCEDURE — 99223 1ST HOSP IP/OBS HIGH 75: CPT | Performed by: INTERNAL MEDICINE

## 2020-10-02 PROCEDURE — 84484 ASSAY OF TROPONIN QUANT: CPT

## 2020-10-02 PROCEDURE — 36415 COLL VENOUS BLD VENIPUNCTURE: CPT

## 2020-10-02 PROCEDURE — 2700000000 HC OXYGEN THERAPY PER DAY

## 2020-10-02 RX ADMIN — ALBUTEROL SULFATE 2.5 MG: 2.5 SOLUTION RESPIRATORY (INHALATION) at 22:15

## 2020-10-02 RX ADMIN — MECLIZINE HYDROCHLORIDE 25 MG: 25 TABLET ORAL at 09:46

## 2020-10-02 RX ADMIN — INSULIN LISPRO 1 UNITS: 100 INJECTION, SOLUTION INTRAVENOUS; SUBCUTANEOUS at 18:03

## 2020-10-02 RX ADMIN — BUDESONIDE 250 MCG: 0.25 SUSPENSION RESPIRATORY (INHALATION) at 22:15

## 2020-10-02 RX ADMIN — ALBUTEROL SULFATE 2.5 MG: 2.5 SOLUTION RESPIRATORY (INHALATION) at 14:50

## 2020-10-02 RX ADMIN — BUDESONIDE 250 MCG: 0.25 SUSPENSION RESPIRATORY (INHALATION) at 07:20

## 2020-10-02 RX ADMIN — ARFORMOTEROL TARTRATE 15 MCG: 15 SOLUTION RESPIRATORY (INHALATION) at 22:15

## 2020-10-02 RX ADMIN — ALBUTEROL SULFATE 2.5 MG: 2.5 SOLUTION RESPIRATORY (INHALATION) at 07:20

## 2020-10-02 RX ADMIN — HEPARIN SODIUM 13 UNITS/KG/HR: 10000 INJECTION, SOLUTION INTRAVENOUS at 20:50

## 2020-10-02 RX ADMIN — HEPARIN SODIUM 4000 UNITS: 1000 INJECTION INTRAVENOUS; SUBCUTANEOUS at 10:08

## 2020-10-02 RX ADMIN — POTASSIUM CHLORIDE 20 MEQ: 1500 TABLET, EXTENDED RELEASE ORAL at 09:46

## 2020-10-02 RX ADMIN — ARFORMOTEROL TARTRATE 15 MCG: 15 SOLUTION RESPIRATORY (INHALATION) at 07:20

## 2020-10-02 RX ADMIN — PANTOPRAZOLE SODIUM 40 MG: 40 TABLET, DELAYED RELEASE ORAL at 18:03

## 2020-10-02 ASSESSMENT — PAIN SCALES - GENERAL
PAINLEVEL_OUTOF10: 0
PAINLEVEL_OUTOF10: 0

## 2020-10-02 NOTE — PROGRESS NOTES
RESPIRATORY THERAPY ASSESSMENT    Name:  Melanie Motley [facility] 166 Record Number:  [id number]  Age: 82 y.o. Gender: female  : 1935  Today's Date:  10/1/2020  Room:  6322/6322-01    Assessment     Is the patient being admitted for a COPD or Asthma exacerbation? No   (If yes the patient will be seen every 4 hours for the first 24 hours and then reassessed)    Patient Admission Diagnosis NSTEMI      Allergies  Allergies   Allergen Reactions    Ampicillin     Cephalexin     Clonidine Derivatives     Erythromycin Diarrhea    Flovent [Fluticasone] Other (See Comments)     Stomach pain    Lactose     Lisinopril Other (See Comments)     Body burning    Losartan Diarrhea    Nsaids     Simvastatin     Sulfa Antibiotics     Atenolol Other (See Comments)     Hallucinations     Pulmonary History:COPD, Asthma and Diabetic  Home Oxygen Therapy:  room air  Home Respiratory Therapy:Albuterol and Dulera   Current Respiratory Therapy:  HHN Brovana, Pulmicort BID, HHN Albuterol q4hr prn  Treatment Type: HHN  Medications: Arformoterol    Respiratory Severity Index(RSI)   Patients with orders for inhalation medications, oxygen, or any therapeutic treatment modality will be placed on Respiratory Protocol. They will be assessed with the first treatment and at least every 72 hours thereafter. The following severity scale will be used to determine frequency of treatment intervention.     Smoking History: Pulmonary Disease or Smoking History, Greater than 15 pack year = 2    Social History  Social History     Tobacco Use    Smoking status: Current Every Day Smoker     Packs/day: 0.25     Types: Cigarettes    Smokeless tobacco: Never Used   Substance Use Topics    Alcohol use: No    Drug use: No       Recent Surgical History: None = 0  Past Surgical History  Past Surgical History:   Procedure Laterality Date    BACK SURGERY      BRAIN SURGERY      HYSTERECTOMY      TYMPANOPLASTY         Level of Consciousness: Alert, Oriented, and Cooperative = 0    Level of Activity: Walking with assistance = 1    Respiratory Pattern: Regular Pattern; RR 8-20 = 0    Breath Sounds: Diminshed bilaterally and/or crackles = 2    Sputum   ,  , Sputum How Obtained: Cough on request  Cough: Strong, spontaneous, non-productive = 0    Vital Signs   BP (!) 166/75   Pulse 88   Temp 97.2 °F (36.2 °C) (Oral)   Resp 18   Ht 5' 5\" (1.651 m)   Wt 198 lb (89.8 kg)   SpO2 99%   BMI 32.95 kg/m²   SPO2 (COPD values may differ): 90-91% on room air or greater than 92% on FiO2 24- 28% = 1    Peak Flow (asthma only): not applicable = 0    RSI: 5-6 = Q4hr PRN (every four hours as needed) for dyspnea        Plan       Goals: medication delivery    Patient/caregiver was educated on the proper method of use for Respiratory Care Devices:  Yes      Level of patient/caregiver understanding able to:   ? Verbalize understanding   ? Demonstrate understanding       ? Teach back        ? Needs reinforcement       ? No available caregiver               ? Other:     Response to education:  Good     Is patient being placed on Home Treatment Regimen? Yes     Does the patient have everything they need prior to discharge? NA     Comments: Pt uses HHN Albuterol qid and prn at night, Dulera BID    Plan of Care: Home meds. HHN Albuterol 4 x's daily and q4hr prn, HHN Pulmicort/Brovana BID    Electronically signed by Leanna Kamara RCP on 10/1/2020 at 10:50 PM    Respiratory Protocol Guidelines     1. Assessment and treatment by Respiratory Therapy will be initiated for medication and therapeutic interventions upon initiation of aerosolized medication. 2. Physician will be contacted for respiratory rate (RR) greater than 35 breaths per minute. Therapy will be held for heart rate (HR) greater than 140 beats per minute, pending direction from physician.   3. Bronchodilators will be administered via Metered Dose Inhaler (MDI) with spacer when the following criteria are met:  a. Alert and cooperative     b. HR < 140 bpm  c. RR < 30 bpm                d. Can demonstrate a 2-3 second inspiratory hold  4. Bronchodilators will be administered via Hand Held Nebulizer [facility]) to patients when ANY of the following criteria are met  a. Incognizant or uncooperative          b. Patients treated with HHN at Home        c. Unable to demonstrate proper use of MDI with spacer     d. RR > 30 bpm   5. Bronchodilators will be delivered via Metered Dose Inhaler (MDI), HHN, Aerogen to intubated patients on mechanical ventilation. 6. Inhalation medication orders will be delivered and/or substituted as outlined below. Aerosolized Medications Ordering and Administration Guidelines:    1. All Medications will be ordered by a physician, and their frequency and/or modality will be adjusted as defined by the patients Respiratory Severity Index (RSI) score. 2. If the patient does not have documented COPD, consider discontinuing anticholinergics when RSI is less than 9.  3. If the bronchospasm worsens (increased RSI), then the bronchodilator frequency can be increased to a maximum of every 4 hours. If greater than every 4 hours is required, the physician will be contacted. 4. If the bronchospasm improves, the frequency of the bronchodilator can be decreased, based on the patient's RSI, but not less than home treatment regimen frequency. 5. Bronchodilator(s) will be discontinued if patient has a RSI less than 9 and has received no scheduled or as needed treatment for 72  Hrs. Patients Ordered on a Mucolytic Agent:    1. Must always be administered with a bronchodilator. 2. Discontinue if patient experiences worsened bronchospasm, or secretions have lessened to the point that the patient is able to clear them with a cough. Anti-inflammatory and Combination Medications:    1.  If the patient lacks prior history of lung disease, is not using inhaled anti-inflammatory medication at home, and lacks

## 2020-10-02 NOTE — PROGRESS NOTES
Pt admitted to room 6322. Pt AxOx4 and VSS. Pt placed on tele with no complications. Pt's admission and skin assessment complete. Pt instructed to call for any needs, pt verbalized understanding. Will continue to monitor.

## 2020-10-02 NOTE — PROGRESS NOTES
Training, Stair training, Functional Mobility Training, Balance Training, Home Exercise Program, Safety Education & Training, Strengthening  Safety Devices  Type of devices: Bed alarm in place, Left in bed, Nurse notified, Call light within reach    G-Code       OutComes Score                                                          Goals  Short term goals  Time Frame for Short term goals: discharge  Short term goal 1: sit <> supine independent  Short term goal 2: Pt will be able to maintain static sitting for 1 minute  Patient Goals   Patient goals : to return home       Therapy Time   Individual Concurrent Group Co-treatment   Time In 1137         Time Out 1226         Minutes 49               Timed Code Treatment Minutes:   34    Total Treatment Minutes:  2360 KATHY Tai    Therapist was present, directed the patient's care, made skilled judgment, and was responsible for assessment and treatment of the patient.     Vik Young, PT, DPT  [id number]

## 2020-10-02 NOTE — FLOWSHEET NOTE
10/02/20 1419   Encounter Summary   Services provided to: Patient   Referral/Consult From: [facility]   (10/2/2020, KIAH. )   Complexity of Encounter Moderate   Length of Encounter 15 minutes   Routine   Type Initial   Assessment Approachable   Intervention Nurtured hope   Outcome Expressed gratitude

## 2020-10-02 NOTE — PROGRESS NOTES
Occupational Therapy   Occupational Therapy Initial Assessment/Treatment  Date: 10/2/2020   Patient Name: Siena Hernández  MRN: [id number]     : 1935    Date of Service: 10/2/2020    Discharge Recommendations: Siena Hernández scored a 16/24 on the AM-PAC ADL Inpatient form. Current research shows that an AM-PAC score of 17 or less is typically not associated with a discharge to the patient's home setting. Based on the patient's AM-PAC score and their current ADL deficits, it is recommended that the patient have 3-5 sessions per week of Occupational Therapy at d/c to increase the patient's independence. Please see assessment section for further patient specific details. If patient discharges prior to next session this note will serve as a discharge summary. Please see below for the latest assessment towards goals. Assessment   Performance deficits / Impairments: Decreased functional mobility ; Decreased ADL status; Decreased ROM; Decreased strength  Assessment: Pt from home where she lived alone. Pt at baseline independent with ADLs and functional mobility. Pt currently reporting dizziness and unable to tolerate standing or functional mobility. Pt requiring CGA for static sitting. Pt would benefit from continued OT during acute stay. Recommend 24 hr hands on assist and ongoing OT at d/c  Treatment Diagnosis: Impaired ADLs and functional mobility  Prognosis: Good  Decision Making: Medium Complexity  OT Education: OT Role;Plan of Care  REQUIRES OT FOLLOW UP: Yes  Activity Tolerance  Activity Tolerance: Treatment limited secondary to medical complications (free text)(Treatment limited 2* pt reporting feeling dizzy and wanting her medication.  BP in supine 139/69, seated /62)  Safety Devices  Safety Devices in place: Yes  Type of devices: Left in bed;Bed alarm in place;Call light within reach;Nurse notified           Patient Diagnosis(es): The encounter diagnosis was NSTEMI (non-ST elevated myocardial infarction) ([facility] [address] has a past medical history of Asthma, COPD exacerbation ([facility] [address] Diabetes mellitus ([facility] [address] Hyperlipidemia, and Hypertension. has a past surgical history that includes brain surgery; back surgery; Hysterectomy; and Tympanoplasty. Treatment Diagnosis: Impaired ADLs and functional mobility      Restrictions  Position Activity Restriction  Other position/activity restrictions: Up at tolerated    Subjective   General  Chart Reviewed: Yes  Additional Pertinent Hx: Pt is 83 y/o F to ED w/ c/o dizziness. Hospital course Head CT (-), CXR (-). PMH significant for Asthma, COPD exacerbation, Diabetes mellitus, Hyperlipidemia, and Hypertension  Family / Caregiver Present: No  Referring Practitioner: Rc Maher MD  Diagnosis: NSTEMI  Subjective  Subjective: Pt supine in bed upon arrival. Pt hesitant to work with OT because she wanted her medication first, but agreeable after mod explanations and encouragement. Social/Functional History  Social/Functional History  Lives With: Alone  Type of Home: House  Home Layout: Two level, Able to Live on Main level with bedroom/bathroom  Home Access: Stairs to enter without rails(no steps to enter front door, 4 to enter side door.  Usually uses side door)  Entrance Stairs - Number of Steps: 4  Bathroom Shower/Tub: Tub/Shower unit, Shower chair with back  Bathroom Toilet: Standard  Home Equipment: [employer] Help From: Family(daughter goes to get groceries now)  Active : No  Patient's  Info: Daughter drives if she needs to go anywhere  Occupation: Retired       Objective   Vision: Impaired(wears glasses occasionally,states \"I seldom need them\")  Hearing: Exceptions to [facility]  Hearing Exceptions: Hard of hearing/hearing concerns    Orientation  Overall Orientation Status: Within Functional Limits     Balance  Sitting Balance: Contact guard assistance(Seated EOB, 1 major LOB to R side, laying down onto bed.)  Standing Balance: Unable to assess(comment)(Pt reporting dizziness, not wanting to stand up, not appropriate at this time)           Bed mobility  Rolling to Left: Stand by assistance  Rolling to Right: Stand by assistance(use of bed rail)  Supine to Sit: Contact guard assistance(HOB slightly elevated, requiring increased time and use of bed rail)  Sit to Supine: (Not formally assessed.  Pt seated EOB w/ 1 LOB to R side, laying down onto bed)  Scooting: Stand by assistance        Cognition  Overall Cognitive Status: Exceptions  Cognition Comment: Pt with good awareness of deficits, however with poor awareness of need for assistance at discharge                 LUE AROM (degrees)  LUE AROM : Exceptions(Tested in supine)  L Shoulder Flexion 0-180: ~90 degrees during ROM test, ~30 degrees during MMT  L Shoulder ABduction 0-180: ~20 degrees  RUE AROM (degrees)  RUE AROM : Exceptions(Tested in supine)  R Shoulder Flexion 0-180: ~90 degrees  LUE Strength  Gross LUE Strength: Exceptions to WFL(tested in supine)  L Shoulder Flex: 3-/5  L Shoulder ABduction: 3-/5  L Elbow Flex: 3+/5  RUE Strength  Gross RUE Strength: Exceptions to WFL(tested in supine)  R Shoulder Flex: 3-/5  R Shoulder ABduction: 3-/5                   Plan   Plan  Times per week: 2-5  Times per day: Daily  Current Treatment Recommendations: Functional Mobility Training, Self-Care / ADL, Balance Training    G-Code     OutComes Score                                                  -PAC Score        [facility] Inpatient Daily Activity Raw Score: 16 (10/02/20 1009)  AM-PAC Inpatient ADL T-Scale Score : 35.96 (10/02/20 1009)  ADL Inpatient CMS 0-100% Score: 53.32 (10/02/20 1009)  ADL Inpatient CMS G-Code Modifier : CK (10/02/20 1009)    Goals  Short term goals  Time Frame for Short term goals: discharge  Short term goal 1: Pt to participate in [facility] transfer  Short term goal 2: Pt to participate in total body dressing assessment  Short term goal 3: Pt to tolerate x1 minute standing with CGA  Patient Goals   Patient goals : to go home       Therapy Time   Individual Concurrent Group Co-treatment   Time In 0845         Time Out 1017         Minutes 54              Timed Code Treatment Minutes:   39    Total Treatment Minutes:  [address] 197, S/OT  Ravin Nunn OTR/YOUSIF 0605  Therapist was present, directed the patient's care, made skilled judgement, was responsible for assessment and treatment of the patient.

## 2020-10-02 NOTE — PROGRESS NOTES
clear.  Neck: Supple, with full range of motion. No jugular venous distention. Trachea midline. Respiratory:  Normal respiratory effort. Clear to auscultation, bilaterally without Rales/Wheezes/Rhonchi. Cardiovascular: Regular rate and rhythm with normal S1/S2 without murmurs, rubs or gallops. Abdomen: Soft, non-tender, non-distended with normal bowel sounds. Musculoskeletal: No clubbing, cyanosis or edema bilaterally. Full range of motion without deformity. Skin: Skin color, texture, turgor normal.  No rashes or lesions. Neurologic:  Neurovascularly intact without any focal sensory/motor deficits. Cranial nerves: II-XII intact, grossly non-focal.  Psychiatric: Alert and oriented, thought content appropriate, normal insight  Capillary Refill: Brisk,< 3 seconds   Peripheral Pulses: +2 palpable, equal bilaterally       Labs:   Recent Labs     10/01/20  1348 10/01/20  1543 10/01/20  2104 10/02/20  0327 10/02/20  0632   WBC 3.9* 9.2  --   --   --    HGB 9.8* 10.1* 9.5* 9.0* 8.7*   HCT 31.0* 32.0* 29.7* 28.0* 27.0*    231  --   --   --      Recent Labs     10/01/20  1348      K 4.1   CL 98*   CO2 31   BUN 26*   CREATININE 1.0   CALCIUM 9.4     No results for input(s): AST, ALT, BILIDIR, BILITOT, ALKPHOS in the last 72 hours. Recent Labs     10/01/20  1543   INR 0.93     Recent Labs     10/01/20  1543 10/01/20  2104 10/02/20  0327   TROPONINI 0.23* 0.22* 0.28*       Urinalysis:      Lab Results   Component Value Date    NITRU Negative 10/01/2020    WBCUA 0-2 10/01/2020    BACTERIA 2+ 10/01/2020    RBCUA 0-2 10/01/2020    BLOODU TRACE-INTACT 10/01/2020    SPECGRAV 1.020 10/01/2020    GLUCOSEU Negative 10/01/2020       Radiology:  XR CHEST PORTABLE   Final Result      No acute disease. CT HEAD WO CONTRAST   Final Result      No acute intracranial abnormality.                     Assessment/Plan:    Active Hospital Problems    Diagnosis Date Noted    [facility] [I21.[facility]] 10/01/2020     Assessment and plan  #NSTEMI  Trend troponin. Continue heparin drip. Pending echocardiogram and cardiology consultation. Continue aspirin, statin, beta-blocker. #Chronic vertigo    #Anemia chronic. Iron deficiency  We will start on iron supplements after cath report. #Diabetes mellitus type 2  Metformin on hold. Continue insulin sliding scale. #COPD/asthma not in exacerbation. Continue home bronchodilators. DVT Prophylaxis: heparin  Diet: DIET CARDIAC;  Code Status: Full Code    PT/OT Eval Status: deferred    Dispo - inpatient. Pending echo and cardio recs.     This chart was likely completed using voice recognition technology and may contain unintended grammatical , phraseology,and/or punctuation errors      Christel Marte MD

## 2020-10-02 NOTE — CARE COORDINATION
Explained to patient that she did not do well for therapy evaluations and suggesting a SNF. Patient stated that she will not go to a SNF and she will not have home care. CM will continue to follow patient until discharge. Remains on 1L O2 at present.  Electronically signed by Ale Prater RN on 10/2/2020 at 5:01 PM

## 2020-10-02 NOTE — PROGRESS NOTES
4 Eyes Admission Assessment     I agree as the admission nurse that 2 RN's have performed a thorough Head to Toe Skin Assessment on the patient. ALL assessment sites listed below have been assessed on admission. Areas assessed by both nurses:  [x]   Head, Face, and Ears   [x]   Shoulders, Back, and Chest  [x]   Arms, Elbows, and Hands   [x]   Coccyx, Sacrum, and Ischium  [x]   Legs, Feet, and Heels        Does the Patient have Skin Breakdown?   No         Gunnar Prevention initiated:  Yes   Wound Care Orders initiated:  NA      [facility] nurse consulted for Pressure Injury (Stage 3,4, Unstageable, DTI, NWPT, and Complex wounds) or Gunnar score 18 or lower:  No      Nurse 1 eSignature: Electronically signed by Wesley Perez RN on 10/2/20 at 12:39 AM EDT    **SHARE this note so that the co-signing nurse is able to place an eSignature**    Nurse 2 eSignature: Electronically signed by Robyn Wright RN on 10/2/20 at 5:30 AM EDT

## 2020-10-02 NOTE — PLAN OF CARE
Problem: Skin Integrity:  Goal: Will show no infection signs and symptoms  Description: Will show no infection signs and symptoms  10/2/2020 1146 by Akosua Mix RN  Note: No signs and symptoms of skin infection noted at this time. Patient skin cleansed as needed, patient able to turn self in bed. Will continue to monitor and reassess     Problem: Falls - Risk of:  Goal: Will remain free from falls  Description: Will remain free from falls  10/2/2020 1146 by Akosua Mix RN  Note: Patient is free from accidental falls at this time. Patient is currently in bed locked to the lowest position with alarm on and side rails up x2. Bed side table, call button and personal belongings within. Patient reminded to call nursing staff for assistance when needed. Patient verbalized understanding.  Will continue to monitor and reassess

## 2020-10-02 NOTE — CONSULTS
[facility]  Cardiology Inpatient Consult Service                                                                                          Pt Name: Dong Scott  Age: 82 y.o. Sex: female  : 1935  Location: [facility]/8597-    Referring Physician: Raquel Vasques MD      Reason for Consult:       Reason for Consultation/Chief Complaint: NSTEMI      HPI:      Dong Scott is a 82 y.o. female with a past medical history of HTN, asthma, DM, chronic vertigo who presented to the hospital with Dizziness. Patient states that she has similar episodes to this in the past. She has chronic episodes like this since her brain surgery. She was at home and the episode was particularly bad she was able to call the ambulance and take her meclizine. She also reports being SOB, but this is a chronic issue as well. It has been worse, because she ran out of her albuterol nebulizer vials 5 days ago and has not been able to use it daily. Patient was found to have a normal EKG and elevated troponin. She was started on a heparin drip and admitted tot [facility]. At this time patient states her dizziness and breathing are better. We discussed that the patient had an NSTEMI and we wanted to perform an angiogram to evaluate her coronary arteries and possibly fix a blockage we might see. Patient refused the procedure. I explained the benefits of the procedure and the risks of not getting the procedure including death. Patient does not want to be put to sleep and doesn't want any surgery. She is alert and oriented and able to make her own medial decisions. She denies chest pain, irregular heart beat, lower extremity edema, near-syncope, orthopnea and palpitations. Histories     Past Medical History:   has a past medical history of Asthma, COPD exacerbation ([facility] [address] Diabetes mellitus ([facility] [address] Hyperlipidemia, and Hypertension.     Surgical History:   has a past surgical history that includes brain surgery; back surgery; Hysterectomy; and Tympanoplasty. Social History:   reports that she has been smoking cigarettes. She has been smoking about 0.25 packs per day. She has never used smokeless tobacco. She reports that she does not drink alcohol or use drugs. Family History:  No evidence for sudden cardiac death or premature CAD      Medications:       Home Medications  Were reviewed and are listed in nursing record. and/or listed below  Prior to Admission medications    Medication Sig Start Date End Date Taking? Authorizing Provider   betaxolol (KERLONE) 10 MG tablet Take 10 mg by mouth 2 times daily   Yes Historical Provider, MD   albuterol (PROVENTIL) (2.5 MG/3ML) 0.083% nebulizer solution USE 1 VIAL IN NEBULIZER EVERY 4 HOURS AS NEEDED FOR WHEEZING 3/9/20  Yes Adelso Kwan MD   metFORMIN (GLUCOPHAGE) 500 MG tablet Take 1 tablet by mouth daily (with breakfast) 3/5/19  Yes Adelso Kwan MD   amLODIPine (NORVASC) 2.5 MG tablet Take 1 tablet by mouth 2 times daily 10/24/17  Yes Julio Kendall DO   albuterol (PROVENTIL HFA;VENTOLIN HFA) 108 (90 BASE) MCG/ACT inhaler Inhale 2 puffs into the lungs every 6 hours as needed. Yes Historical Provider, MD   meclizine (ANTIVERT) 25 MG tablet Take 25 mg by mouth every 6 hours as needed for Dizziness    Yes Historical Provider, MD   hydrochlorothiazide (HYDRODIURIL) 25 MG tablet Take 25 mg by mouth daily. Yes Historical Provider, MD   blood glucose monitor strips 1 strip by Other route Test 2 times a day & as needed for symptoms of irregular blood glucose.     Historical Provider, MD          Inpatient Medications:   aspirin  324 mg Oral Once    pantoprazole  40 mg Oral BID AC    sodium chloride flush  10 mL Intravenous 2 times per day    lidocaine  1 patch Topical Daily    metoprolol succinate  25 mg Oral Daily    potassium chloride  20 mEq Oral Every Other Day    insulin lispro  0-6 Units Subcutaneous TID     insulin lispro  0-3 Units Subcutaneous Nightly    budesonide  0.25 mg Nebulization BID    Arformoterol Tartrate  15 mcg Nebulization BID    albuterol  2.5 mg Nebulization 4x daily       IV drips:   heparin (PORCINE) Infusion 13 Units/kg/hr (10/02/20 1004)    dextrose         PRN:  heparin (porcine), heparin (porcine), perflutren lipid microspheres, sodium chloride flush, acetaminophen **OR** acetaminophen, polyethylene glycol, promethazine **OR** ondansetron, cyclobenzaprine, albuterol, meclizine, glucose, dextrose, glucagon (rDNA), dextrose    Allergy:     Ampicillin; Cephalexin; Clonidine derivatives; Erythromycin; Flovent [fluticasone]; Lactose; Lisinopril; Losartan; Nsaids; Simvastatin; Sulfa antibiotics; and Atenolol       Review of Systems:     All 12 point review of symptoms completed. Pertinent positives identified in the HPI, all other review of symptoms negative as below. CONSTITUTIONAL: Nounanticipated weight loss. No change in energy level, sleep pattern, or activity level. SKIN: No rash or pruritis. EYES: No visual changes or diplopia. No scleral icterus. ENT: No Headaches, hearing loss. No mouth sores or sore throat. + Vertigo  CARDIOVASCULAR: No chest pain/chest pressure/chest discomfort. No palpitations. RESPIRATORY: No cough, no sputum production. No hematemesis. + dyspnea  GASTROINTESTINAL: No N/V/D. No abdominal pain, appetite loss, blood in stools. GENITOURINARY: No dysuria, trouble voiding, or hematuria. MUSCULOSKELETAL:  No gait disturbance, weakness or joint complaints. NEUROLOGICAL: No headache, numbness or tingling. No change in gait, coordination, mood, affect, mentation, behavior. PSHYCH: No anxiety, loss of interest, change in sexual behavior, feelings of self-harm, or confusion. ENDOCRINE: No malaise, fatigue or temperature intolerance. No excessive thirst, fluid intake, or urination. No tremor. HEMATOLOGIC: No abnormal bruising or bleeding.       Physical Examination:     Vitals:    10/02/20 9661 10/02/20 0725 10/02/20 0941 10/02/20 1311   BP:   (!) 137/58 (!) 160/70   Pulse:   70 81   Resp:   18 17   Temp:   98.9 °F (37.2 °C) 97.9 °F (36.6 °C)   TempSrc:   Oral Oral   SpO2:  98% 97% 99%   Weight: 190 lb 11.2 oz (86.5 kg)      Height:           Wt Readings from Last 3 Encounters:   10/02/20 190 lb 11.2 oz (86.5 kg)   09/07/19 189 lb 9.5 oz (86 kg)   03/05/19 183 lb (83 kg)       Objective:  General Appearance:  Comfortable, well-appearing and in no acute distress. Vital signs: (most recent): Blood pressure (!) 160/70, pulse 81, temperature 97.9 °F (36.6 °C), temperature source Oral, resp. rate 17, height 5' 5\" (1.651 m), weight 190 lb 11.2 oz (86.5 kg), SpO2 99 %, currently breastfeeding. Vital signs are normal.  No fever.           General Appearance:  Alert, cooperative, no distress, appears stated age Appropriate weight   Head:  Normocephalic, without obvious abnormality, atraumatic   Eyes:  conjunctiva/corneas clear EOM intact  Ears normal        Nose: Nares normal, no drainage or sinus tenderness   Throat: Lips, mucosa, and tongue normal   Neck: Supple, symmetrical, trachea midline, no adenopathy, thyroid: not enlarged, symmetric, no carotid bruit or JVD       Lungs:   Diminished throughout, bilateral expiratory wheezing, respirations unlabored   Chest Wall:  No tenderness or deformity   Heart:  Regular rate and rhythm, S1, S2 normal, no murmur, rub or gallop PMI intact   Abdomen:   Soft, non-tender, bowel sounds active all four quadrants,  no masses, no organomegaly       Extremities: Extremities normal, atraumatic, no cyanosis or edema   Pulses: 2+ and symmetric   Skin: Skin color, texture, turgor normal, no rashes or lesions   Pysch: Normal mood and affect   Neurologic: Normal gross motor and sensory exam.        Labs:     Recent Labs     10/01/20  1348      K 4.1   BUN 26*   CREATININE 1.0   CL 98*   CO2 31   GLUCOSE 182*   CALCIUM 9.4     Recent Labs     10/01/20  1348 10/01/20  1543 10/01/20  2104 10/02/20  0327 10/02/20  0632   WBC 3.9* 9.2  --   --   --    HGB 9.8* 10.1* 9.5* 9.0* 8.7*   HCT 31.0* 32.0* 29.7* 28.0* 27.0*    231  --   --   --    MCV 82.8 83.1  --   --   --      No results for input(s): CHOLTOT, TRIG, HDL in the last 72 hours. Invalid input(s): LIPIDCOMM, CHOLHDL, VLDCHOL, LDL  Recent Labs     10/01/20  1543   INR 0.93     Recent Labs     10/01/20  1348 10/01/20  1543 10/01/20  2104 10/02/20  0327   TROPONINI 0.24* 0.23* 0.22* 0.28*     No results for input(s): BNP in the last 72 hours. No results for input(s): TSH in the last 72 hours. No results for input(s): CHOL, HDL, LDLCALC, TRIG in the last 72 hours.]    Lab Results   Component Value Date    CKTOTAL 189 10/05/2018    TROPONINI 0.28 (H) 10/02/2020         Imaging:     I personally reviewed imaging studies including CXR, Stress test, TTE/DENNY. Last ECG (if available) - EKG:  I have reviewed EKG with the following interpretation  NSR, no ST-T changes. Telemetry:  Sinus    Last Stress (if available):    Last TTE/DENNY(if available):   Normal left ventricle size, wall thickness, and systolic function with an   estimated ejection fraction of 55-60%. No regional wall motion abnormalities   are seen. Diastolic filling parameters suggests normal diastolic function. The right atrium is dilated. Estimated pulmonary artery systolic pressure is   at 29 mmHg assuming a right atrial pressure of 8 mmHg. No evidence of   significant valvular stenosis or regurgitation present    Last Cath (if available):    Last CMR  (if available):      Assessment / Plan:     NSTEMI  Spoke with patient at length regarding angiogram, patient adamently refuse cath at this time. When we discussed risks of not getting the cath she expressed \"Well, I guess I'll just die then\" Patient is able to make decisions. She seems to be most afraid of being put to sleep and wants no surgery.  Explained the procedure wasn't really surgery and she

## 2020-10-02 NOTE — CARE COORDINATION
Case Management Assessment           Initial Evaluation                Date / Time of Evaluation: 10/2/2020 10:28 AM                 Assessment Completed by: Marin Kovacs    Patient Name: Barb Roberts     YOB: 1935  Diagnosis: NSTEMI (non-ST elevated myocardial infarction) [facility]) [I21.4]  NSTEMI (non-ST elevated myocardial infarction) [facility]) [I21.4]     Date / Time: 10/1/2020  1:13 PM    Patient Admission Status: Inpatient    If patient is discharged prior to next notation, then this note serves as note for discharge by case management. Current PCP: Hannah Johnson MD  Clinic Patient: No    Chart Reviewed: Yes  Patient/ Family Interviewed: Yes    Initial assessment completed at bedside with:     Hospitalization in the last 30 days: No    Emergency Contacts:  Extended Emergency Contact Information  Primary Emergency Contact: [address] [place] [address] Phone: [phone]  Mobile Phone: [phone]  Relation: Child    Advance Directives:   Code Status: Full Code    Healthcare Power of : No  Financial  Payor: Bharati Sibley / Plan: Marvin Foote PPO / Product Type: Medicare /     Pre-cert required for SNF: Yes    Pharmacy    [facility] [address] LUISA REID [facility], [address] [place] [id number] [facility] [phone]  [facility]  Phone: [phone] Fax: [phone]      Potential assistance Purchasing Medications: Potential Assistance Purchasing Medications: No  Does Patient want to participate in local refill/ meds to beds program?: No    Meds To Beds General Rules:  1. Can ONLY be done Monday- Friday between 8:30am-5pm  2. Prescription(s) must be in pharmacy by 3pm to be filled same day  3. Copy of patient's insurance/ prescription drug card and patient face sheet must be sent along with the prescription(s)  4. Cost of Rx cannot be added to hospital bill.  If financial assistance is needed, please contact unit case manager or ;  or  CANNOT provide pharmacy voucher for patients co-pays  5. Patients can then  the prescription on their way out of the hospital at discharge, or pharmacy can deliver to the bedside if staff is available. (payment due at time of pick-up or delivery - cash, check, or card accepted)     Able to afford home medications/ co-pay costs: Yes    ADLS  Support Systems: Children, Family Members    PT AM-PAC:   /24  OT AM-PAC: 16 /24 on 10-2-2020    HOUSING  Home Environment: lives alone on first floor of her home   Steps: none at front door  Plans to RETURN to current housing: Yes  Barriers to RETURNING to current housing: medical complications    Magdalene Acosta 80  Currently ACTIVE with [address]: No  Home Care Agency: Not Applicable    Currently ACTIVE with [place] [facility]: No        Durable Medical Equipment  DME Provider: marvak  Equipment: presently has : shower chir, cane   Home Oxygen and Respiratory Equipment  Has HOME OXYGEN prior to admission: No    DISCHARGE PLAN:  Disposition: TBD  Transportation PLAN for discharge:TBD  Factors facilitating achievement of predicted outcomes: Family support, Friend support, Motivated and Cooperative    Barriers to discharge: Medical complications    Additional Case Management Notes: Patient here for NSTEMI. From home alone. OT armand says 16. Will speak to patient after PT eval is completed to see if she is receptive to SNF.      The Plan for Transition of Care is related to the following treatment goals of NSTEMI (non-ST elevated myocardial infarction) [facility]) [I21.4]  NSTEMI (non-ST elevated myocardial infarction) ([facility] [address] [I21.4]    The Patient and/or patient representative Kelly Ball and her family were provided with a choice of provider and agrees with the discharge plan Yes    Freedom of choice list was provided with basic dialogue that supports the patient's individualized plan of care/goals and shares the quality data associated with the providers.  Yes    Care Transition patient: No    Shirley Pham RN  [facility]  Case Management Department  Ph: [phone]

## 2020-10-02 NOTE — PROGRESS NOTES
Patient is on Heparin drip. Patient's Anti XA was drawn this morning around 0630 but not resulted until around 0930. A bolus was given and rate was changed to 13 units/kg/hr. Informed lab to draw next Anti XA 6 hours from when rate changed around 1000. Will continue to monitor and reassess.

## 2020-10-03 LAB
ANTI-XA UNFRAC HEPARIN: 0.49 IU/ML (ref 0.3–0.7)
ANTI-XA UNFRAC HEPARIN: 0.72 IU/ML (ref 0.3–0.7)
ANTI-XA UNFRAC HEPARIN: 0.86 IU/ML (ref 0.3–0.7)
ANTI-XA UNFRAC HEPARIN: 1.05 IU/ML (ref 0.3–0.7)
GLUCOSE BLD-MCNC: 167 MG/DL (ref 70–99)
GLUCOSE BLD-MCNC: 177 MG/DL (ref 70–99)
GLUCOSE BLD-MCNC: 211 MG/DL (ref 70–99)
GLUCOSE BLD-MCNC: 245 MG/DL (ref 70–99)
HCT VFR BLD CALC: 27.9 % (ref 36–48)
HCT VFR BLD CALC: 28 % (ref 36–48)
HCT VFR BLD CALC: 29.2 % (ref 36–48)
HCT VFR BLD CALC: 29.6 % (ref 36–48)
HEMOGLOBIN: 8.9 G/DL (ref 12–16)
HEMOGLOBIN: 8.9 G/DL (ref 12–16)
HEMOGLOBIN: 9.2 G/DL (ref 12–16)
HEMOGLOBIN: 9.3 G/DL (ref 12–16)
LV EF: 58 %
LVEF MODALITY: NORMAL
MCH RBC QN AUTO: 26.3 PG (ref 26–34)
MCHC RBC AUTO-ENTMCNC: 31.6 G/DL (ref 31–36)
MCV RBC AUTO: 83.1 FL (ref 80–100)
PDW BLD-RTO: 15.5 % (ref 12.4–15.4)
PERFORMED ON: ABNORMAL
PLATELET # BLD: 238 K/UL (ref 135–450)
PMV BLD AUTO: 7.2 FL (ref 5–10.5)
RBC # BLD: 3.37 M/UL (ref 4–5.2)
WBC # BLD: 5.2 K/UL (ref 4–11)

## 2020-10-03 PROCEDURE — 85520 HEPARIN ASSAY: CPT

## 2020-10-03 PROCEDURE — 36415 COLL VENOUS BLD VENIPUNCTURE: CPT

## 2020-10-03 PROCEDURE — 1200000000 HC SEMI PRIVATE

## 2020-10-03 PROCEDURE — 93306 TTE W/DOPPLER COMPLETE: CPT

## 2020-10-03 PROCEDURE — 85027 COMPLETE CBC AUTOMATED: CPT

## 2020-10-03 PROCEDURE — 2580000003 HC RX 258: Performed by: INTERNAL MEDICINE

## 2020-10-03 PROCEDURE — 6360000002 HC RX W HCPCS: Performed by: INTERNAL MEDICINE

## 2020-10-03 PROCEDURE — 85014 HEMATOCRIT: CPT

## 2020-10-03 PROCEDURE — 85018 HEMOGLOBIN: CPT

## 2020-10-03 PROCEDURE — 6370000000 HC RX 637 (ALT 250 FOR IP): Performed by: INTERNAL MEDICINE

## 2020-10-03 PROCEDURE — 94640 AIRWAY INHALATION TREATMENT: CPT

## 2020-10-03 PROCEDURE — 99233 SBSQ HOSP IP/OBS HIGH 50: CPT | Performed by: INTERNAL MEDICINE

## 2020-10-03 RX ORDER — HYDROCHLOROTHIAZIDE 25 MG/1
25 TABLET ORAL DAILY
Status: DISCONTINUED | OUTPATIENT
Start: 2020-10-03 | End: 2020-10-08 | Stop reason: HOSPADM

## 2020-10-03 RX ORDER — FAMOTIDINE 20 MG/1
20 TABLET, FILM COATED ORAL NIGHTLY
Status: DISCONTINUED | OUTPATIENT
Start: 2020-10-03 | End: 2020-10-07

## 2020-10-03 RX ORDER — AMLODIPINE BESYLATE 2.5 MG/1
2.5 TABLET ORAL 2 TIMES DAILY
Status: DISCONTINUED | OUTPATIENT
Start: 2020-10-03 | End: 2020-10-08 | Stop reason: HOSPADM

## 2020-10-03 RX ORDER — ASPIRIN 81 MG/1
81 TABLET, CHEWABLE ORAL DAILY
Status: DISCONTINUED | OUTPATIENT
Start: 2020-10-03 | End: 2020-10-08 | Stop reason: HOSPADM

## 2020-10-03 RX ADMIN — FAMOTIDINE 20 MG: 20 TABLET, FILM COATED ORAL at 20:16

## 2020-10-03 RX ADMIN — INSULIN LISPRO 1 UNITS: 100 INJECTION, SOLUTION INTRAVENOUS; SUBCUTANEOUS at 22:48

## 2020-10-03 RX ADMIN — ALBUTEROL SULFATE 2.5 MG: 2.5 SOLUTION RESPIRATORY (INHALATION) at 13:19

## 2020-10-03 RX ADMIN — BUDESONIDE 250 MCG: 0.25 SUSPENSION RESPIRATORY (INHALATION) at 19:45

## 2020-10-03 RX ADMIN — INSULIN LISPRO 2 UNITS: 100 INJECTION, SOLUTION INTRAVENOUS; SUBCUTANEOUS at 15:05

## 2020-10-03 RX ADMIN — ASPIRIN 81 MG: 81 TABLET, CHEWABLE ORAL at 09:09

## 2020-10-03 RX ADMIN — ALBUTEROL SULFATE 2.5 MG: 2.5 SOLUTION RESPIRATORY (INHALATION) at 19:45

## 2020-10-03 RX ADMIN — PANTOPRAZOLE SODIUM 40 MG: 40 TABLET, DELAYED RELEASE ORAL at 06:17

## 2020-10-03 RX ADMIN — MECLIZINE HYDROCHLORIDE 25 MG: 25 TABLET ORAL at 12:17

## 2020-10-03 RX ADMIN — INSULIN LISPRO 1 UNITS: 100 INJECTION, SOLUTION INTRAVENOUS; SUBCUTANEOUS at 18:03

## 2020-10-03 RX ADMIN — AMLODIPINE BESYLATE 2.5 MG: 2.5 TABLET ORAL at 20:16

## 2020-10-03 RX ADMIN — ARFORMOTEROL TARTRATE 15 MCG: 15 SOLUTION RESPIRATORY (INHALATION) at 19:46

## 2020-10-03 RX ADMIN — ALBUTEROL SULFATE 2.5 MG: 2.5 SOLUTION RESPIRATORY (INHALATION) at 17:00

## 2020-10-03 RX ADMIN — Medication 10 ML: at 09:10

## 2020-10-03 RX ADMIN — ALBUTEROL SULFATE 2.5 MG: 2.5 SOLUTION RESPIRATORY (INHALATION) at 10:13

## 2020-10-03 RX ADMIN — AMLODIPINE BESYLATE 2.5 MG: 2.5 TABLET ORAL at 12:17

## 2020-10-03 RX ADMIN — HYDROCHLOROTHIAZIDE 25 MG: 25 TABLET ORAL at 12:17

## 2020-10-03 RX ADMIN — BUDESONIDE 250 MCG: 0.25 SUSPENSION RESPIRATORY (INHALATION) at 10:13

## 2020-10-03 RX ADMIN — ARFORMOTEROL TARTRATE 15 MCG: 15 SOLUTION RESPIRATORY (INHALATION) at 10:14

## 2020-10-03 RX ADMIN — METOPROLOL SUCCINATE 25 MG: 25 TABLET, EXTENDED RELEASE ORAL at 09:09

## 2020-10-03 RX ADMIN — INSULIN LISPRO 1 UNITS: 100 INJECTION, SOLUTION INTRAVENOUS; SUBCUTANEOUS at 09:07

## 2020-10-03 ASSESSMENT — PAIN SCALES - GENERAL
PAINLEVEL_OUTOF10: 0
PAINLEVEL_OUTOF10: 6
PAINLEVEL_OUTOF10: 0
PAINLEVEL_OUTOF10: 0
PAINLEVEL_OUTOF10: 3

## 2020-10-03 ASSESSMENT — PAIN DESCRIPTION - DESCRIPTORS
DESCRIPTORS: ACHING
DESCRIPTORS: ACHING

## 2020-10-03 ASSESSMENT — PAIN - FUNCTIONAL ASSESSMENT
PAIN_FUNCTIONAL_ASSESSMENT: PREVENTS OR INTERFERES SOME ACTIVE ACTIVITIES AND ADLS
PAIN_FUNCTIONAL_ASSESSMENT: PREVENTS OR INTERFERES SOME ACTIVE ACTIVITIES AND ADLS

## 2020-10-03 ASSESSMENT — PAIN DESCRIPTION - LOCATION
LOCATION: LEG
LOCATION: LEG

## 2020-10-03 ASSESSMENT — PAIN DESCRIPTION - FREQUENCY
FREQUENCY: INTERMITTENT
FREQUENCY: INTERMITTENT

## 2020-10-03 ASSESSMENT — PAIN DESCRIPTION - ONSET
ONSET: GRADUAL
ONSET: GRADUAL

## 2020-10-03 ASSESSMENT — PAIN DESCRIPTION - ORIENTATION
ORIENTATION: RIGHT;LEFT
ORIENTATION: RIGHT;LEFT

## 2020-10-03 ASSESSMENT — PAIN DESCRIPTION - PAIN TYPE
TYPE: CHRONIC PAIN
TYPE: CHRONIC PAIN

## 2020-10-03 ASSESSMENT — PAIN DESCRIPTION - PROGRESSION
CLINICAL_PROGRESSION: GRADUALLY IMPROVING
CLINICAL_PROGRESSION: GRADUALLY WORSENING

## 2020-10-03 NOTE — PROGRESS NOTES
[facility] Daily Progress Note      Admit Date:  10/1/2020    Subjective:  Ms. Jorge Giraldo was seen and examined. F/U trop/non st/dizziness. Some better this am. Had some sob through night. Inhaler helped. No chest pain.      Objective:   BP (!) 149/74   Pulse 83   Temp 98.8 °F (37.1 °C) (Oral)   Resp 20   Ht 5' 5\" (1.651 m)   Wt 190 lb 7.6 oz (86.4 kg)   SpO2 95%   BMI 31.70 kg/m²       Intake/Output Summary (Last 24 hours) at 10/3/2020 0646  Last data filed at 10/3/2020 5855  Gross per 24 hour   Intake 810.45 ml   Output 0 ml   Net 810.45 ml       TELEMETRY: Sinus     Physical Exam:  General:  Awake, alert, NAD  Skin:  Warm and dry  Neck:  JVP difficult  Chest:  Minimal wheeze  Cardiovascular:  RRR S1S2  Abdomen:  Soft nontennder  Extremities:  no edema    Medications:    aspirin  324 mg Oral Once    pantoprazole  40 mg Oral BID AC    sodium chloride flush  10 mL Intravenous 2 times per day    lidocaine  1 patch Topical Daily    metoprolol succinate  25 mg Oral Daily    potassium chloride  20 mEq Oral Every Other Day    insulin lispro  0-6 Units Subcutaneous TID WC    insulin lispro  0-3 Units Subcutaneous Nightly    budesonide  0.25 mg Nebulization BID    Arformoterol Tartrate  15 mcg Nebulization BID    albuterol  2.5 mg Nebulization 4x daily      heparin (PORCINE) Infusion 8 Units/kg/hr (10/03/20 0626)    dextrose       heparin (porcine), heparin (porcine), perflutren lipid microspheres, sodium chloride flush, acetaminophen **OR** acetaminophen, polyethylene glycol, promethazine **OR** ondansetron, cyclobenzaprine, albuterol, meclizine, glucose, dextrose, glucagon (rDNA), dextrose    Lab Data:  CBC:   Recent Labs     10/01/20  1348 10/01/20  1543  10/02/20  1630 10/02/20  2248 10/03/20  0411   WBC 3.9* 9.2  --   --   --  5.2   HGB 9.8* 10.1*   < > 8.6* 9.9* 8.9*   HCT 31.0* 32.0*   < > 27.1* 31.5* 28.0*   MCV 82.8 83.1  --   --   --  83.1    231  --   --   --  238    < > = values in this interval not displayed. BMP:   Recent Labs     10/01/20  1348      K 4.1   CL 98*   CO2 31   BUN 26*   CREATININE 1.0     LIVER PROFILE: No results for input(s): AST, ALT, LIPASE, BILIDIR, BILITOT, ALKPHOS in the last 72 hours. Invalid input(s): AMYLASE,  ALB  PT/INR:   Recent Labs     10/01/20  1543   PROTIME 10.8   INR 0.93     APTT:   Recent Labs     10/01/20  1543   APTT 21.9*     BNP:  No results for input(s): BNP in the last 72 hours. IMAGING:     Assessment:  Patient Active Problem List    Diagnosis Date Noted    NSTEMI (non-ST elevated myocardial infarction) ([facility] [address] 10/01/2020    Acute asthma exacerbation 09/07/2019    Pleuritic chest pain 10/05/2018    COPD exacerbation ([facility] [address]     Cervical radiculopathy 10/01/2018    Asthma exacerbation, mild 09/29/2018    Moderate persistent asthma with exacerbation 10/21/2017    Otalgia 05/24/2017    Urge incontinence 11/28/2016    Vertigo 06/21/2014    Asymptomatic PVCs 04/03/2014    Disturbance of skin sensation 02/15/2012    Vitamin D deficiency 02/15/2012    Conductive hearing loss, external ear 10/17/2011    Encephalocele ([facility] [address] 08/15/2011    Otitis media 06/26/2011    Moderate persistent asthma without complication 75/65/8182    Anxiety state 05/21/2008    Dizziness and giddiness 05/21/2008    Esophageal reflux 05/21/2008    Essential hypertension 05/21/2008    Type 2 diabetes mellitus ([facility] [address] 05/21/2008       Plan:  1. No chest pain. Some wheeze/sob. Improved with inhaler. Medical tx since refusing cath. ASA/B blocker. Allergic reportedly to statin.        Core Measures:  · Discharge instructions:   · LVEF documented:   · ACEI for LV dysfunction:   · Smoking Cessation:    Amy Tamayo MD 10/3/2020 6:46 AM

## 2020-10-03 NOTE — PROGRESS NOTES
Hospitalist Progress Note      PCP: Sejal Maza MD    Date of Admission: 10/1/2020    CC dizziness, vertigo. Hospital course  Patient is 82-year-old female with history of chronic vertigo, follows up with the ENT and came to ER yesterday for severe dizziness, severe vertigo denies any loss of consciousness chest pain or palpitations. Work-up in ER was significant for troponin of 0.24. EKG did not showed any acute changes. Patient was started on heparin drip. Subjective  Seen and examined today. Denies chest pain, shortness of breath, nausea, vomiting, fever, chills. She refused left heart cath yesterday. Cardiology recommended medical management.   Medications:  Reviewed    Infusion Medications    heparin (PORCINE) Infusion 8 Units/kg/hr (10/03/20 0626)    dextrose       Scheduled Medications    aspirin  81 mg Oral Daily    famotidine  20 mg Oral Nightly    amLODIPine  2.5 mg Oral BID    hydroCHLOROthiazide  25 mg Oral Daily    aspirin  324 mg Oral Once    sodium chloride flush  10 mL Intravenous 2 times per day    lidocaine  1 patch Topical Daily    metoprolol succinate  25 mg Oral Daily    potassium chloride  20 mEq Oral Every Other Day    insulin lispro  0-6 Units Subcutaneous TID WC    insulin lispro  0-3 Units Subcutaneous Nightly    budesonide  0.25 mg Nebulization BID    Arformoterol Tartrate  15 mcg Nebulization BID    albuterol  2.5 mg Nebulization 4x daily     PRN Meds: heparin (porcine), heparin (porcine), perflutren lipid microspheres, sodium chloride flush, acetaminophen **OR** acetaminophen, polyethylene glycol, promethazine **OR** ondansetron, cyclobenzaprine, albuterol, meclizine, glucose, dextrose, glucagon (rDNA), dextrose      Intake/Output Summary (Last 24 hours) at 10/3/2020 1253  Last data filed at 10/3/2020 1214  Gross per 24 hour   Intake 1050.45 ml   Output 200 ml   Net 850.45 ml       Physical Exam Performed:    BP (!) 167/74   Pulse 88   Temp 98.7 °F (37.1 °C) (Oral)   Resp 19   Ht 5' 5\" (1.651 m)   Wt 190 lb 7.6 oz (86.4 kg)   SpO2 98%   BMI 31.70 kg/m²     General appearance: No apparent distress, appears stated age and cooperative. HEENT: Pupils equal, round, and reactive to light. Conjunctivae/corneas clear. Neck: Supple, with full range of motion. No jugular venous distention. Trachea midline. Respiratory:  Normal respiratory effort. Clear to auscultation, bilaterally without Rales/Wheezes/Rhonchi. Cardiovascular: Regular rate and rhythm with normal S1/S2 without murmurs, rubs or gallops. Abdomen: Soft, non-tender, non-distended with normal bowel sounds. Musculoskeletal: No clubbing, cyanosis or edema bilaterally. Full range of motion without deformity. Skin: Skin color, texture, turgor normal.  No rashes or lesions. Neurologic:  Neurovascularly intact without any focal sensory/motor deficits. Cranial nerves: II-XII intact, grossly non-focal.  Psychiatric: Alert and oriented, thought content appropriate, normal insight  Capillary Refill: Brisk,< 3 seconds   Peripheral Pulses: +2 palpable, equal bilaterally       Labs:   Recent Labs     10/01/20  1348 10/01/20  1543  10/02/20  2248 10/03/20  0411 10/03/20  0919   WBC 3.9* 9.2  --   --  5.2  --    HGB 9.8* 10.1*   < > 9.9* 8.9* 9.3*   HCT 31.0* 32.0*   < > 31.5* 28.0* 29.6*    231  --   --  238  --     < > = values in this interval not displayed. Recent Labs     10/01/20  1348      K 4.1   CL 98*   CO2 31   BUN 26*   CREATININE 1.0   CALCIUM 9.4     No results for input(s): AST, ALT, BILIDIR, BILITOT, ALKPHOS in the last 72 hours.   Recent Labs     10/01/20  1543   INR 0.93     Recent Labs     10/01/20  1543 10/01/20  2104 10/02/20  0327   TROPONINI 0.23* 0.22* 0.28*       Urinalysis:      Lab Results   Component Value Date    NITRU Negative 10/01/2020    WBCUA 0-2 10/01/2020    BACTERIA 2+ 10/01/2020    RBCUA 0-2 10/01/2020    BLOODU TRACE-INTACT 10/01/2020    SPECGRAV 1.020

## 2020-10-03 NOTE — PLAN OF CARE
Problem: Skin Integrity:  Goal: Will show no infection signs and symptoms  Description: Will show no infection signs and symptoms  Outcome: Met This Shift  Note: Patient is not showing any signs and symptoms of infection at this time. Patient's vital signs stable and patient is not receiving antibiotics at this time. Problem: Skin Integrity:  Goal: Absence of new skin breakdown  Description: Absence of new skin breakdown  Outcome: Met This Shift  Note: Patient has not developed any new skin breakdown during admission. Upon admission patient found to have dry,flaky skin. Patient does not have any redness. Patient is able to turn self. Problem: Falls - Risk of:  Goal: Will remain free from falls  Description: Will remain free from falls  Outcome: Met This Shift  Note: Patient has remained free from falls this shift. Patient's bed is locked in lowest position with alarm on, call light, bedside table, and personal belongings within reach. Patient calls out to staff for needs and does not try to get up without assistance.

## 2020-10-04 LAB
ANTI-XA UNFRAC HEPARIN: 0.46 IU/ML (ref 0.3–0.7)
GLUCOSE BLD-MCNC: 150 MG/DL (ref 70–99)
GLUCOSE BLD-MCNC: 186 MG/DL (ref 70–99)
GLUCOSE BLD-MCNC: 192 MG/DL (ref 70–99)
GLUCOSE BLD-MCNC: 316 MG/DL (ref 70–99)
HCT VFR BLD CALC: 27.9 % (ref 36–48)
HCT VFR BLD CALC: 28.9 % (ref 36–48)
HCT VFR BLD CALC: 28.9 % (ref 36–48)
HCT VFR BLD CALC: 29.3 % (ref 36–48)
HEMOGLOBIN: 9.1 G/DL (ref 12–16)
HEMOGLOBIN: 9.2 G/DL (ref 12–16)
HEMOGLOBIN: 9.3 G/DL (ref 12–16)
HEMOGLOBIN: 9.4 G/DL (ref 12–16)
PERFORMED ON: ABNORMAL

## 2020-10-04 PROCEDURE — 85014 HEMATOCRIT: CPT

## 2020-10-04 PROCEDURE — 99232 SBSQ HOSP IP/OBS MODERATE 35: CPT | Performed by: INTERNAL MEDICINE

## 2020-10-04 PROCEDURE — 85018 HEMOGLOBIN: CPT

## 2020-10-04 PROCEDURE — 6370000000 HC RX 637 (ALT 250 FOR IP): Performed by: INTERNAL MEDICINE

## 2020-10-04 PROCEDURE — 6360000002 HC RX W HCPCS: Performed by: INTERNAL MEDICINE

## 2020-10-04 PROCEDURE — 94761 N-INVAS EAR/PLS OXIMETRY MLT: CPT

## 2020-10-04 PROCEDURE — 2580000003 HC RX 258: Performed by: INTERNAL MEDICINE

## 2020-10-04 PROCEDURE — 94640 AIRWAY INHALATION TREATMENT: CPT

## 2020-10-04 PROCEDURE — 85520 HEPARIN ASSAY: CPT

## 2020-10-04 PROCEDURE — 36415 COLL VENOUS BLD VENIPUNCTURE: CPT

## 2020-10-04 PROCEDURE — 1200000000 HC SEMI PRIVATE

## 2020-10-04 RX ORDER — CLOPIDOGREL BISULFATE 75 MG/1
75 TABLET ORAL DAILY
Status: DISCONTINUED | OUTPATIENT
Start: 2020-10-04 | End: 2020-10-08 | Stop reason: HOSPADM

## 2020-10-04 RX ORDER — FERROUS SULFATE 325(65) MG
325 TABLET ORAL
Status: DISCONTINUED | OUTPATIENT
Start: 2020-10-05 | End: 2020-10-08 | Stop reason: HOSPADM

## 2020-10-04 RX ORDER — ACETAMINOPHEN 160 MG/5ML
650 SOLUTION ORAL EVERY 6 HOURS PRN
Status: DISCONTINUED | OUTPATIENT
Start: 2020-10-04 | End: 2020-10-08 | Stop reason: HOSPADM

## 2020-10-04 RX ORDER — MECLIZINE HYDROCHLORIDE 25 MG/1
25 TABLET ORAL 2 TIMES DAILY
Status: DISCONTINUED | OUTPATIENT
Start: 2020-10-04 | End: 2020-10-05

## 2020-10-04 RX ADMIN — INSULIN LISPRO 1 UNITS: 100 INJECTION, SOLUTION INTRAVENOUS; SUBCUTANEOUS at 13:04

## 2020-10-04 RX ADMIN — INSULIN LISPRO 2 UNITS: 100 INJECTION, SOLUTION INTRAVENOUS; SUBCUTANEOUS at 20:01

## 2020-10-04 RX ADMIN — HEPARIN SODIUM 6 UNITS/KG/HR: 10000 INJECTION, SOLUTION INTRAVENOUS at 03:20

## 2020-10-04 RX ADMIN — ARFORMOTEROL TARTRATE 15 MCG: 15 SOLUTION RESPIRATORY (INHALATION) at 08:46

## 2020-10-04 RX ADMIN — ASPIRIN 81 MG: 81 TABLET, CHEWABLE ORAL at 10:02

## 2020-10-04 RX ADMIN — ALBUTEROL SULFATE 2.5 MG: 2.5 SOLUTION RESPIRATORY (INHALATION) at 17:46

## 2020-10-04 RX ADMIN — ALBUTEROL SULFATE 2.5 MG: 2.5 SOLUTION RESPIRATORY (INHALATION) at 21:03

## 2020-10-04 RX ADMIN — BUDESONIDE 250 MCG: 0.25 SUSPENSION RESPIRATORY (INHALATION) at 08:46

## 2020-10-04 RX ADMIN — AMLODIPINE BESYLATE 2.5 MG: 2.5 TABLET ORAL at 20:01

## 2020-10-04 RX ADMIN — Medication 10 ML: at 10:11

## 2020-10-04 RX ADMIN — Medication 10 ML: at 20:01

## 2020-10-04 RX ADMIN — BUDESONIDE 250 MCG: 0.25 SUSPENSION RESPIRATORY (INHALATION) at 21:03

## 2020-10-04 RX ADMIN — FAMOTIDINE 20 MG: 20 TABLET, FILM COATED ORAL at 20:01

## 2020-10-04 RX ADMIN — MECLIZINE HYDROCHLORIDE 25 MG: 25 TABLET ORAL at 10:05

## 2020-10-04 RX ADMIN — ALBUTEROL SULFATE 2.5 MG: 2.5 SOLUTION RESPIRATORY (INHALATION) at 08:46

## 2020-10-04 RX ADMIN — HYDROCHLOROTHIAZIDE 25 MG: 25 TABLET ORAL at 10:02

## 2020-10-04 RX ADMIN — INSULIN LISPRO 1 UNITS: 100 INJECTION, SOLUTION INTRAVENOUS; SUBCUTANEOUS at 10:03

## 2020-10-04 RX ADMIN — INSULIN LISPRO 1 UNITS: 100 INJECTION, SOLUTION INTRAVENOUS; SUBCUTANEOUS at 17:14

## 2020-10-04 RX ADMIN — MECLIZINE HYDROCHLORIDE 25 MG: 25 TABLET ORAL at 20:01

## 2020-10-04 RX ADMIN — AMLODIPINE BESYLATE 2.5 MG: 2.5 TABLET ORAL at 10:02

## 2020-10-04 RX ADMIN — ALBUTEROL SULFATE 2.5 MG: 2.5 SOLUTION RESPIRATORY (INHALATION) at 12:11

## 2020-10-04 RX ADMIN — CLOPIDOGREL BISULFATE 75 MG: 75 TABLET ORAL at 10:02

## 2020-10-04 RX ADMIN — ALBUTEROL SULFATE 2.5 MG: 2.5 SOLUTION RESPIRATORY (INHALATION) at 06:53

## 2020-10-04 RX ADMIN — ACETAMINOPHEN ORAL SOLUTION 650 MG: 650 SOLUTION ORAL at 18:06

## 2020-10-04 RX ADMIN — ARFORMOTEROL TARTRATE 15 MCG: 15 SOLUTION RESPIRATORY (INHALATION) at 21:03

## 2020-10-04 RX ADMIN — POTASSIUM CHLORIDE 20 MEQ: 1500 TABLET, EXTENDED RELEASE ORAL at 10:02

## 2020-10-04 RX ADMIN — METOPROLOL SUCCINATE 25 MG: 25 TABLET, EXTENDED RELEASE ORAL at 10:02

## 2020-10-04 ASSESSMENT — PAIN DESCRIPTION - LOCATION: LOCATION: LEG

## 2020-10-04 ASSESSMENT — PAIN DESCRIPTION - PAIN TYPE: TYPE: CHRONIC PAIN

## 2020-10-04 ASSESSMENT — PAIN - FUNCTIONAL ASSESSMENT: PAIN_FUNCTIONAL_ASSESSMENT: PREVENTS OR INTERFERES SOME ACTIVE ACTIVITIES AND ADLS

## 2020-10-04 ASSESSMENT — PAIN DESCRIPTION - ONSET: ONSET: GRADUAL

## 2020-10-04 ASSESSMENT — PAIN SCALES - GENERAL
PAINLEVEL_OUTOF10: 3
PAINLEVEL_OUTOF10: 0
PAINLEVEL_OUTOF10: 1
PAINLEVEL_OUTOF10: 0
PAINLEVEL_OUTOF10: 0

## 2020-10-04 ASSESSMENT — PAIN DESCRIPTION - PROGRESSION: CLINICAL_PROGRESSION: GRADUALLY IMPROVING

## 2020-10-04 ASSESSMENT — PAIN DESCRIPTION - FREQUENCY: FREQUENCY: INTERMITTENT

## 2020-10-04 ASSESSMENT — PAIN DESCRIPTION - DESCRIPTORS: DESCRIPTORS: ACHING

## 2020-10-04 ASSESSMENT — PAIN DESCRIPTION - ORIENTATION: ORIENTATION: RIGHT;LEFT

## 2020-10-04 NOTE — PROGRESS NOTES
Hospitalist Progress Note      PCP: Ayala Fischer MD    Date of Admission: 10/1/2020    CC dizziness, vertigo. Hospital course  Patient is 68-year-old female with history of chronic vertigo, follows up with the ENT and came to ER yesterday for severe dizziness, severe vertigo denies any loss of consciousness chest pain or palpitations. Work-up in ER was significant for troponin of 0.24. EKG did not showed any acute changes. Patient was started on heparin drip. Subjective  I am feeling dizzi this morning need my Meclizine scheduled. Denies cp, palpitation, fever, chills.  SOB  Medications:  Reviewed    Infusion Medications    dextrose       Scheduled Medications    clopidogrel  75 mg Oral Daily    meclizine  25 mg Oral BID    aspirin  81 mg Oral Daily    famotidine  20 mg Oral Nightly    amLODIPine  2.5 mg Oral BID    hydroCHLOROthiazide  25 mg Oral Daily    aspirin  324 mg Oral Once    sodium chloride flush  10 mL Intravenous 2 times per day    lidocaine  1 patch Topical Daily    metoprolol succinate  25 mg Oral Daily    potassium chloride  20 mEq Oral Every Other Day    insulin lispro  0-6 Units Subcutaneous TID WC    insulin lispro  0-3 Units Subcutaneous Nightly    budesonide  0.25 mg Nebulization BID    Arformoterol Tartrate  15 mcg Nebulization BID    albuterol  2.5 mg Nebulization 4x daily     PRN Meds: perflutren lipid microspheres, sodium chloride flush, acetaminophen **OR** acetaminophen, polyethylene glycol, promethazine **OR** ondansetron, cyclobenzaprine, albuterol, glucose, dextrose, glucagon (rDNA), dextrose      Intake/Output Summary (Last 24 hours) at 10/4/2020 1439  Last data filed at 10/4/2020 3055  Gross per 24 hour   Intake 610.18 ml   Output 0 ml   Net 610.18 ml       Physical Exam Performed:    /66   Pulse 87   Temp 98.3 °F (36.8 °C) (Oral)   Resp 18   Ht 5' 5\" (1.651 m)   Wt 189 lb 6 oz (85.9 kg)   SpO2 95%   BMI 31.51 kg/m²     General appearance: acute disease. CT HEAD WO CONTRAST   Final Result      No acute intracranial abnormality. Assessment/Plan:    Active Hospital Problems    Diagnosis Date Noted    NSTEMI (non-ST elevated myocardial infarction) ([facility] [address] [I21.4] 10/01/2020     Assessment and plan  #NSTEMI  Trend troponin. heparin drip. For 48 hours. Echo no WMA. Continue aspirin, plaviz statin, beta-blocker. Patient is refusing left heart cath. Cardiology recommended medical management. #Chronic vertigo  Meclizine    #Anemia chronic. Iron deficiency  Iron supplement    #Diabetes mellitus type 2  Metformin on hold. Continue insulin sliding scale. #COPD/asthma not in exacerbation. Continue home bronchodilators. DVT Prophylaxis: heparin  Diet: DIET CARDIAC;  Code Status: Full Code    PT/OT Eval Status: in progress    Dispo - inpatient. If no dizziness can be discharged tomorrow.     This chart was likely completed using voice recognition technology and may contain unintended grammatical , phraseology,and/or punctuation errors      Viral Jimenez MD

## 2020-10-04 NOTE — PLAN OF CARE
Problem: Falls - Risk of:  Goal: Will remain free from falls  Description: Will remain free from falls  10/4/2020 1601 by Levar Narayanan RN  Note: Patient is a medium fall risk, fall precautions in place, patient remains free from falls.       Problem: Skin Integrity:  Goal: Will show no infection signs and symptoms  Description: Will show no infection signs and symptoms  10/4/2020 1601 by Levar Narayanan RN  Outcome: Ongoing  Note: Patient skin remains intact, patient educated on need for frequent turning and repositioning to maintain skin integrity, patient verbalized understanding

## 2020-10-04 NOTE — PLAN OF CARE
Problem: Skin Integrity:  Goal: Will show no infection signs and symptoms  Description: Will show no infection signs and symptoms  Outcome: Met This Shift  Note: Patient is not exhibiting any signs or symptoms of infection at this time. Vital signs have been stable and WBC normal. Patient is not receiving any antibiotics at this time. Will continue to monitor. Problem: Falls - Risk of:  Goal: Will remain free from falls  Description: Will remain free from falls  Outcome: Met This Shift  Note: Patient has remained free from falls this shift. Patient does not ambulate much and uses the bedpan to urinate. Patient's bed is locked in lowest position with alarm on, call light, bedside table and personal belongings within reach. Patient calls out to staff and does not try to get up without assistance. Problem: Pain:  Goal: Pain level will decrease  Description: Pain level will decrease  Outcome: Ongoing  Note: Patient complained of pain twice this shift which she reported as chronic muscle spasms in both legs that come and go. Patient has lidocaine patches ordered which she states does not usually help because \"the patch is put on before my pain starts, then the pain goes away, and the patch just doesn't help\". Patient doesn't c/o any other pain at this time. Will continue to monitor.

## 2020-10-04 NOTE — PROGRESS NOTES
[address] Daily Progress Note      Admit Date:  10/1/2020    Subjective:  Ms. Nicol Zapata was seen and examined. F/U trop/non st/dizziness. Some sob helped with inhaler. No chest pain.      Objective:   BP (!) 150/65   Pulse 85   Temp 97.5 °F (36.4 °C) (Oral)   Resp 18   Ht 5' 5\" (1.651 m)   Wt 190 lb 7.6 oz (86.4 kg)   SpO2 94%   BMI 31.70 kg/m²       Intake/Output Summary (Last 24 hours) at 10/4/2020 2003  Last data filed at 10/3/2020 2251  Gross per 24 hour   Intake 910.04 ml   Output 200 ml   Net 710.04 ml       TELEMETRY: Sinus     Physical Exam:  General:  Awake, alert, NAD  Skin:  Warm and dry  Neck:  JVP difficult  Chest:  Minimal wheeze  Cardiovascular:  RRR S1S2  Abdomen:  Soft nontennder  Extremities:  no edema    Medications:    aspirin  81 mg Oral Daily    famotidine  20 mg Oral Nightly    amLODIPine  2.5 mg Oral BID    hydroCHLOROthiazide  25 mg Oral Daily    aspirin  324 mg Oral Once    sodium chloride flush  10 mL Intravenous 2 times per day    lidocaine  1 patch Topical Daily    metoprolol succinate  25 mg Oral Daily    potassium chloride  20 mEq Oral Every Other Day    insulin lispro  0-6 Units Subcutaneous TID WC    insulin lispro  0-3 Units Subcutaneous Nightly    budesonide  0.25 mg Nebulization BID    Arformoterol Tartrate  15 mcg Nebulization BID    albuterol  2.5 mg Nebulization 4x daily      heparin (PORCINE) Infusion 6 Units/kg/hr (10/04/20 0320)    dextrose       heparin (porcine), heparin (porcine), perflutren lipid microspheres, sodium chloride flush, acetaminophen **OR** acetaminophen, polyethylene glycol, promethazine **OR** ondansetron, cyclobenzaprine, albuterol, meclizine, glucose, dextrose, glucagon (rDNA), dextrose    Lab Data:  CBC:   Recent Labs     10/01/20  1348 10/01/20  1543  10/03/20  0411  10/03/20  1559 10/03/20  2208 10/04/20  0430   WBC 3.9* 9.2  --  5.2  --   --   --   --    HGB 9.8* 10.1*   < > 8.9*   < > 9.2* 8.9* 9.4*   HCT 31.0* 32.0*

## 2020-10-05 LAB
GLUCOSE BLD-MCNC: 152 MG/DL (ref 70–99)
GLUCOSE BLD-MCNC: 153 MG/DL (ref 70–99)
GLUCOSE BLD-MCNC: 167 MG/DL (ref 70–99)
GLUCOSE BLD-MCNC: 178 MG/DL (ref 70–99)
HCT VFR BLD CALC: 27.2 % (ref 36–48)
HCT VFR BLD CALC: 27.2 % (ref 36–48)
HCT VFR BLD CALC: 28.4 % (ref 36–48)
HCT VFR BLD CALC: 28.5 % (ref 36–48)
HCT VFR BLD CALC: 29.4 % (ref 36–48)
HEMOGLOBIN: 8.8 G/DL (ref 12–16)
HEMOGLOBIN: 8.8 G/DL (ref 12–16)
HEMOGLOBIN: 9.1 G/DL (ref 12–16)
HEMOGLOBIN: 9.2 G/DL (ref 12–16)
HEMOGLOBIN: 9.5 G/DL (ref 12–16)
MCH RBC QN AUTO: 26.5 PG (ref 26–34)
MCHC RBC AUTO-ENTMCNC: 32.2 G/DL (ref 31–36)
MCV RBC AUTO: 82.3 FL (ref 80–100)
PDW BLD-RTO: 15.6 % (ref 12.4–15.4)
PERFORMED ON: ABNORMAL
PLATELET # BLD: 255 K/UL (ref 135–450)
PMV BLD AUTO: 7.2 FL (ref 5–10.5)
RBC # BLD: 3.46 M/UL (ref 4–5.2)
WBC # BLD: 5.5 K/UL (ref 4–11)

## 2020-10-05 PROCEDURE — 2580000003 HC RX 258: Performed by: INTERNAL MEDICINE

## 2020-10-05 PROCEDURE — 94640 AIRWAY INHALATION TREATMENT: CPT

## 2020-10-05 PROCEDURE — 6360000002 HC RX W HCPCS: Performed by: INTERNAL MEDICINE

## 2020-10-05 PROCEDURE — 6370000000 HC RX 637 (ALT 250 FOR IP): Performed by: INTERNAL MEDICINE

## 2020-10-05 PROCEDURE — 36415 COLL VENOUS BLD VENIPUNCTURE: CPT

## 2020-10-05 PROCEDURE — 85014 HEMATOCRIT: CPT

## 2020-10-05 PROCEDURE — 1200000000 HC SEMI PRIVATE

## 2020-10-05 PROCEDURE — 85018 HEMOGLOBIN: CPT

## 2020-10-05 PROCEDURE — 85027 COMPLETE CBC AUTOMATED: CPT

## 2020-10-05 PROCEDURE — 94761 N-INVAS EAR/PLS OXIMETRY MLT: CPT

## 2020-10-05 RX ORDER — MECLIZINE HYDROCHLORIDE 25 MG/1
25 TABLET ORAL 3 TIMES DAILY
Status: DISCONTINUED | OUTPATIENT
Start: 2020-10-05 | End: 2020-10-08 | Stop reason: HOSPADM

## 2020-10-05 RX ORDER — METOPROLOL SUCCINATE 25 MG/1
25 TABLET, EXTENDED RELEASE ORAL DAILY
Qty: 30 TABLET | Refills: 3 | Status: CANCELLED | OUTPATIENT
Start: 2020-10-06

## 2020-10-05 RX ADMIN — AMLODIPINE BESYLATE 2.5 MG: 2.5 TABLET ORAL at 08:38

## 2020-10-05 RX ADMIN — CLOPIDOGREL BISULFATE 75 MG: 75 TABLET ORAL at 08:38

## 2020-10-05 RX ADMIN — ALBUTEROL SULFATE 2.5 MG: 2.5 SOLUTION RESPIRATORY (INHALATION) at 21:20

## 2020-10-05 RX ADMIN — FAMOTIDINE 20 MG: 20 TABLET, FILM COATED ORAL at 23:09

## 2020-10-05 RX ADMIN — INSULIN LISPRO 1 UNITS: 100 INJECTION, SOLUTION INTRAVENOUS; SUBCUTANEOUS at 23:08

## 2020-10-05 RX ADMIN — INSULIN LISPRO 1 UNITS: 100 INJECTION, SOLUTION INTRAVENOUS; SUBCUTANEOUS at 08:38

## 2020-10-05 RX ADMIN — ASPIRIN 81 MG: 81 TABLET, CHEWABLE ORAL at 08:38

## 2020-10-05 RX ADMIN — MECLIZINE HYDROCHLORIDE 25 MG: 25 TABLET ORAL at 08:38

## 2020-10-05 RX ADMIN — METOPROLOL SUCCINATE 25 MG: 25 TABLET, EXTENDED RELEASE ORAL at 08:38

## 2020-10-05 RX ADMIN — BUDESONIDE 250 MCG: 0.25 SUSPENSION RESPIRATORY (INHALATION) at 21:20

## 2020-10-05 RX ADMIN — Medication 10 ML: at 08:38

## 2020-10-05 RX ADMIN — FERROUS SULFATE TAB 325 MG (65 MG ELEMENTAL FE) 325 MG: 325 (65 FE) TAB at 08:38

## 2020-10-05 RX ADMIN — ALBUTEROL SULFATE 2.5 MG: 2.5 SOLUTION RESPIRATORY (INHALATION) at 15:44

## 2020-10-05 RX ADMIN — INSULIN LISPRO 1 UNITS: 100 INJECTION, SOLUTION INTRAVENOUS; SUBCUTANEOUS at 12:35

## 2020-10-05 RX ADMIN — ARFORMOTEROL TARTRATE 15 MCG: 15 SOLUTION RESPIRATORY (INHALATION) at 21:19

## 2020-10-05 RX ADMIN — BUDESONIDE 250 MCG: 0.25 SUSPENSION RESPIRATORY (INHALATION) at 08:02

## 2020-10-05 RX ADMIN — MECLIZINE HYDROCHLORIDE 25 MG: 25 TABLET ORAL at 23:09

## 2020-10-05 RX ADMIN — ARFORMOTEROL TARTRATE 15 MCG: 15 SOLUTION RESPIRATORY (INHALATION) at 08:02

## 2020-10-05 RX ADMIN — ALBUTEROL SULFATE 2.5 MG: 2.5 SOLUTION RESPIRATORY (INHALATION) at 12:06

## 2020-10-05 RX ADMIN — Medication 10 ML: at 23:09

## 2020-10-05 RX ADMIN — HYDROCHLOROTHIAZIDE 25 MG: 25 TABLET ORAL at 08:37

## 2020-10-05 RX ADMIN — ALBUTEROL SULFATE 2.5 MG: 2.5 SOLUTION RESPIRATORY (INHALATION) at 08:02

## 2020-10-05 RX ADMIN — AMLODIPINE BESYLATE 2.5 MG: 2.5 TABLET ORAL at 23:09

## 2020-10-05 RX ADMIN — INSULIN LISPRO 1 UNITS: 100 INJECTION, SOLUTION INTRAVENOUS; SUBCUTANEOUS at 18:00

## 2020-10-05 ASSESSMENT — PAIN SCALES - GENERAL
PAINLEVEL_OUTOF10: 0

## 2020-10-05 NOTE — PROGRESS NOTES
Hospitalist Progress Note      PCP: Dilan Mansfield MD    Date of Admission: 10/1/2020    CC dizziness, vertigo. Hospital course  Patient is 53-year-old female with history of chronic vertigo, follows up with the ENT and came to ER yesterday for severe dizziness, severe vertigo denies any loss of consciousness chest pain or palpitations. Work-up in ER was significant for troponin of 0.24. EKG did not showed any acute changes. Patient was started on heparin drip. Subjective  Patient still feeling dizzy. Denies cp, palpitation, fever, chills. SOB. Tried sitting up the patient and patient dizzy. Laid back in bed. Patient states that nobody understand how she feels when she gets dizzy. She feels like there is nothing underneath her and she keeps on falling. Discussed possible need for SNF, pt agreeable reluctantly.      Medications:  Reviewed    Infusion Medications    dextrose       Scheduled Medications    clopidogrel  75 mg Oral Daily    meclizine  25 mg Oral BID    ferrous sulfate  325 mg Oral Daily with breakfast    aspirin  81 mg Oral Daily    famotidine  20 mg Oral Nightly    amLODIPine  2.5 mg Oral BID    hydroCHLOROthiazide  25 mg Oral Daily    aspirin  324 mg Oral Once    sodium chloride flush  10 mL Intravenous 2 times per day    lidocaine  1 patch Topical Daily    metoprolol succinate  25 mg Oral Daily    potassium chloride  20 mEq Oral Every Other Day    insulin lispro  0-6 Units Subcutaneous TID WC    insulin lispro  0-3 Units Subcutaneous Nightly    budesonide  0.25 mg Nebulization BID    Arformoterol Tartrate  15 mcg Nebulization BID    albuterol  2.5 mg Nebulization 4x daily     PRN Meds: acetaminophen, perflutren lipid microspheres, sodium chloride flush, polyethylene glycol, promethazine **OR** ondansetron, cyclobenzaprine, albuterol, glucose, dextrose, glucagon (rDNA), dextrose      Intake/Output Summary (Last 24 hours) at 10/5/2020 1043  Last data filed at 10/4/2020 1400  Gross per 24 hour   Intake 292 ml   Output --   Net 292 ml       Physical Exam Performed:    BP (!) 152/76   Pulse 106   Temp 97.1 °F (36.2 °C) (Oral)   Resp 20   Ht 5' 5\" (1.651 m)   Wt 189 lb 6 oz (85.9 kg)   SpO2 93%   BMI 31.51 kg/m²     General appearance: No apparent distress, appears stated age and cooperative. HEENT: Pupils equal, round, and reactive to light. Conjunctivae/corneas clear. Neck: Supple, with full range of motion. No jugular venous distention. Trachea midline. Respiratory:  Normal respiratory effort. Clear to auscultation, bilaterally without Rales/Wheezes/Rhonchi. Cardiovascular: Regular rate and rhythm with normal S1/S2 without murmurs, rubs or gallops. Abdomen: Soft, non-tender, non-distended with normal bowel sounds. Musculoskeletal: No clubbing, cyanosis or edema bilaterally. Full range of motion without deformity. Skin: Skin color, texture, turgor normal.  No rashes or lesions. Neurologic:  Neurovascularly intact without any focal sensory/motor deficits. Cranial nerves: II-XII intact, grossly non-focal.  Psychiatric: Alert and oriented, thought content appropriate, normal insight  Capillary Refill: Brisk,< 3 seconds   Peripheral Pulses: +2 palpable, equal bilaterally       Labs:   Recent Labs     10/03/20  0411  10/05/20  0342 10/05/20  0553 10/05/20  0946   WBC 5.2  --   --  5.5  --    HGB 8.9*   < > 9.1* 9.2* 8.8*   HCT 28.0*   < > 28.4* 28.5* 27.2*     --   --  255  --     < > = values in this interval not displayed. No results for input(s): NA, K, CL, CO2, BUN, CREATININE, CALCIUM, PHOS in the last 72 hours. Invalid input(s): MAGNES  No results for input(s): AST, ALT, BILIDIR, BILITOT, ALKPHOS in the last 72 hours. No results for input(s): INR in the last 72 hours. No results for input(s): Melany Basques in the last 72 hours.     Urinalysis:      Lab Results   Component Value Date    NITRU Negative 10/01/2020    WBCUA 0-2 10/01/2020 BACTERIA 2+ 10/01/2020    RBCUA 0-2 10/01/2020    BLOODU TRACE-INTACT 10/01/2020    SPECGRAV 1.020 10/01/2020    GLUCOSEU Negative 10/01/2020       Radiology:  XR CHEST PORTABLE   Final Result      No acute disease. CT HEAD WO CONTRAST   Final Result      No acute intracranial abnormality. Assessment/Plan:    Active Hospital Problems    Diagnosis Date Noted    NSTEMI (non-ST elevated myocardial infarction) ([facility] [address] [I21.4] 10/01/2020     Assessment and plan  #NSTEMI  Trend troponin. Status post heparin drip for 48 hours  Echo no WMA. Patient is refusing left heart cath. Cardiology recommended medical management. Continue aspirin, plavix, statin, beta-blocker. #Chronic vertigo  Meclizine 25 mg increased to 3 times daily    #Anemia chronic. Iron deficiency  Continue iron supplement    #Diabetes mellitus type 2  Metformin on hold. Continue insulin sliding scale. #COPD/asthma not in exacerbation. Continue home bronchodilators. DVT Prophylaxis: heparin  Diet: DIET CARDIAC;  Code Status: Full Code    PT/OT Eval Status: in progress    Dispo -pending improvement of dizziness. PT score of 10. Would likely need SNF placement. Discussed with social work.     This chart was likely completed using voice recognition technology and may contain unintended grammatical , phraseology,and/or punctuation errors      Marlon Obregon MD

## 2020-10-05 NOTE — PROGRESS NOTES
Physical Therapy/Occupational Therapy  Refusal    Pt declined therapy this pm stating, \" They just tried to get me up and I'm too dizzy. \" Will f/u per POC. RN aware.        Ruth Licona, PT

## 2020-10-05 NOTE — PLAN OF CARE
Problem: Skin Integrity:  Goal: Will show no infection signs and symptoms  Description: Will show no infection signs and symptoms  Outcome: Ongoing  Note: Pt skin remains intact. No signs of breakdown or infections. Pt repositions self frequently and is repositioned by staff at least q2h. Skin kept clean and dry of urine or stool. No episodes of incontinence. Problem: Falls - Risk of:  Goal: Will remain free from falls  Description: Will remain free from falls  Outcome: Ongoing  Note: Pt is at risk for falls due to dizziness. Fall precautions remain in place. Call light and personal belongings within reach. Pt demonstrates understanding of call light and fall precautions. Has called for all needs and made no attempts to get up unassisted. Problem: Pain:  Goal: Pain level will decrease  Description: Pain level will decrease  Outcome: Ongoing  Note: Pt has had no complaints of pain this shift but did say that she occasionally gets cramps along her right side ribs.

## 2020-10-05 NOTE — CARE COORDINATION
Referred to patient for d/c planning. Spoke to patient. Patient continues to prefer to go home on d/c. However, is agreeable to SNF if needed. Patient prefers [facility]. Referral made, facility can accept.  precert started.   Electronically signed by GRETA Otto, MINDI on 10/5/2020 at 4:54 PM

## 2020-10-06 LAB
GLUCOSE BLD-MCNC: 152 MG/DL (ref 70–99)
GLUCOSE BLD-MCNC: 213 MG/DL (ref 70–99)
GLUCOSE BLD-MCNC: 228 MG/DL (ref 70–99)
GLUCOSE BLD-MCNC: 290 MG/DL (ref 70–99)
HCT VFR BLD CALC: 28.2 % (ref 36–48)
HCT VFR BLD CALC: 28.9 % (ref 36–48)
HCT VFR BLD CALC: 29.2 % (ref 36–48)
HEMOGLOBIN: 9.1 G/DL (ref 12–16)
HEMOGLOBIN: 9.4 G/DL (ref 12–16)
HEMOGLOBIN: 9.4 G/DL (ref 12–16)
PERFORMED ON: ABNORMAL

## 2020-10-06 PROCEDURE — 94640 AIRWAY INHALATION TREATMENT: CPT

## 2020-10-06 PROCEDURE — 6370000000 HC RX 637 (ALT 250 FOR IP): Performed by: INTERNAL MEDICINE

## 2020-10-06 PROCEDURE — 97116 GAIT TRAINING THERAPY: CPT

## 2020-10-06 PROCEDURE — U0003 INFECTIOUS AGENT DETECTION BY NUCLEIC ACID (DNA OR RNA); SEVERE ACUTE RESPIRATORY SYNDROME CORONAVIRUS 2 (SARS-COV-2) (CORONAVIRUS DISEASE [COVID-19]), AMPLIFIED PROBE TECHNIQUE, MAKING USE OF HIGH THROUGHPUT TECHNOLOGIES AS DESCRIBED BY CMS-2020-01-R: HCPCS

## 2020-10-06 PROCEDURE — 97535 SELF CARE MNGMENT TRAINING: CPT

## 2020-10-06 PROCEDURE — 36415 COLL VENOUS BLD VENIPUNCTURE: CPT

## 2020-10-06 PROCEDURE — 97530 THERAPEUTIC ACTIVITIES: CPT

## 2020-10-06 PROCEDURE — 6360000002 HC RX W HCPCS: Performed by: INTERNAL MEDICINE

## 2020-10-06 PROCEDURE — 85018 HEMOGLOBIN: CPT

## 2020-10-06 PROCEDURE — 2580000003 HC RX 258: Performed by: INTERNAL MEDICINE

## 2020-10-06 PROCEDURE — 85014 HEMATOCRIT: CPT

## 2020-10-06 PROCEDURE — 1200000000 HC SEMI PRIVATE

## 2020-10-06 RX ADMIN — CLOPIDOGREL BISULFATE 75 MG: 75 TABLET ORAL at 09:00

## 2020-10-06 RX ADMIN — ALBUTEROL SULFATE 2.5 MG: 2.5 SOLUTION RESPIRATORY (INHALATION) at 22:03

## 2020-10-06 RX ADMIN — FERROUS SULFATE TAB 325 MG (65 MG ELEMENTAL FE) 325 MG: 325 (65 FE) TAB at 09:00

## 2020-10-06 RX ADMIN — ASPIRIN 81 MG: 81 TABLET, CHEWABLE ORAL at 09:01

## 2020-10-06 RX ADMIN — ALBUTEROL SULFATE 2.5 MG: 2.5 SOLUTION RESPIRATORY (INHALATION) at 17:50

## 2020-10-06 RX ADMIN — ALBUTEROL SULFATE 2.5 MG: 2.5 SOLUTION RESPIRATORY (INHALATION) at 09:01

## 2020-10-06 RX ADMIN — BUDESONIDE 250 MCG: 0.25 SUSPENSION RESPIRATORY (INHALATION) at 09:02

## 2020-10-06 RX ADMIN — ARFORMOTEROL TARTRATE 15 MCG: 15 SOLUTION RESPIRATORY (INHALATION) at 09:02

## 2020-10-06 RX ADMIN — HYDROCHLOROTHIAZIDE 25 MG: 25 TABLET ORAL at 09:00

## 2020-10-06 RX ADMIN — INSULIN LISPRO 2 UNITS: 100 INJECTION, SOLUTION INTRAVENOUS; SUBCUTANEOUS at 13:11

## 2020-10-06 RX ADMIN — ALBUTEROL SULFATE 2.5 MG: 2.5 SOLUTION RESPIRATORY (INHALATION) at 13:14

## 2020-10-06 RX ADMIN — AMLODIPINE BESYLATE 2.5 MG: 2.5 TABLET ORAL at 09:01

## 2020-10-06 RX ADMIN — BUDESONIDE 250 MCG: 0.25 SUSPENSION RESPIRATORY (INHALATION) at 22:03

## 2020-10-06 RX ADMIN — POTASSIUM CHLORIDE 20 MEQ: 1500 TABLET, EXTENDED RELEASE ORAL at 09:01

## 2020-10-06 RX ADMIN — INSULIN LISPRO 2 UNITS: 100 INJECTION, SOLUTION INTRAVENOUS; SUBCUTANEOUS at 22:26

## 2020-10-06 RX ADMIN — ARFORMOTEROL TARTRATE 15 MCG: 15 SOLUTION RESPIRATORY (INHALATION) at 22:03

## 2020-10-06 RX ADMIN — INSULIN LISPRO 2 UNITS: 100 INJECTION, SOLUTION INTRAVENOUS; SUBCUTANEOUS at 18:02

## 2020-10-06 RX ADMIN — Medication 10 ML: at 09:02

## 2020-10-06 RX ADMIN — MECLIZINE HYDROCHLORIDE 25 MG: 25 TABLET ORAL at 09:00

## 2020-10-06 RX ADMIN — METOPROLOL SUCCINATE 25 MG: 25 TABLET, EXTENDED RELEASE ORAL at 09:00

## 2020-10-06 RX ADMIN — INSULIN LISPRO 1 UNITS: 100 INJECTION, SOLUTION INTRAVENOUS; SUBCUTANEOUS at 08:54

## 2020-10-06 RX ADMIN — CYCLOBENZAPRINE HYDROCHLORIDE 5 MG: 10 TABLET, FILM COATED ORAL at 09:00

## 2020-10-06 ASSESSMENT — PAIN SCALES - GENERAL
PAINLEVEL_OUTOF10: 0

## 2020-10-06 NOTE — PROGRESS NOTES
Occupational Therapy  Facility/Department: [address]  Daily Treatment Note  NAME: Jennifer Rousseau  : 1935  MRN: [id number]    Date of Service: 10/6/2020    Discharge Recommendations:    Jennifer Rousseau scored a 16/24 on the AM-PAC ADL Inpatient form. Current research shows that an AM-PAC score of 17 or less is typically not associated with a discharge to the patient's home setting. Based on the patient's AM-PAC score and their current ADL deficits, it is recommended that the patient have 3-5 sessions per week of Occupational Therapy at d/c to increase the patient's independence. Please see assessment section for further patient specific details. If patient discharges prior to next session this note will serve as a discharge summary. Please see below for the latest assessment towards goals. OT Equipment Recommendations  Equipment Needed: No    Assessment   Performance deficits / Impairments: Decreased functional mobility ; Decreased ADL status; Decreased safe awareness;Decreased endurance;Decreased balance  Assessment: Pt able to transfer OOB today. Pt appears very anxious and needs to move slowly, and  with reassurance. Pt is requiring assist of 2 for transfers for safety. Feel pt would benefit from further OT services. Treatment Diagnosis: Impaired ADLs and functional mobility  Prognosis: Good  OT Education: OT Role;Plan of Care  Patient Education: Pt verbalized understanding  REQUIRES OT FOLLOW UP: Yes  Activity Tolerance  Activity Tolerance: Limited by anxiety  Safety Devices  Safety Devices in place: Yes  Type of devices: Left in chair;Call light within reach; Chair alarm in place;Nurse notified       Restrictions  Position Activity Restriction  Other position/activity restrictions: Up at tolerated, envir precautions  Subjective   General  Chart Reviewed: Yes  Patient assessed for rehabilitation services?: Yes  Additional Pertinent Hx: Pt is 83 y/o F to ED w/ c/o dizziness.  Hospital course to/from toilet with CG  Patient Goals   Patient goals : to go home       Therapy Time   Individual Concurrent Group Co-treatment   Time In 1120         Time Out 1158         Minutes 38         Timed Code Treatment Minutes: [address] [address], OTR/L [address]

## 2020-10-06 NOTE — PROGRESS NOTES
dextrose      Intake/Output Summary (Last 24 hours) at 10/6/2020 0851  Last data filed at 10/6/2020 0811  Gross per 24 hour   Intake 840 ml   Output 200 ml   Net 640 ml       Physical Exam Performed:    /66   Pulse 97   Temp 98.9 °F (37.2 °C) (Oral)   Resp 18   Ht 5' 5\" (1.651 m)   Wt 190 lb 3.2 oz (86.3 kg)   SpO2 95%   BMI 31.65 kg/m²     General appearance: No apparent distress, appears stated age and cooperative. HEENT: Pupils equal, round, and reactive to light. Conjunctivae/corneas clear. Neck: Supple, with full range of motion. No jugular venous distention. Trachea midline. Respiratory:  Normal respiratory effort. Clear to auscultation, bilaterally without Rales/Wheezes/Rhonchi. Cardiovascular: Regular rate and rhythm with normal S1/S2 without murmurs, rubs or gallops. Abdomen: Soft, non-tender, non-distended with normal bowel sounds. Musculoskeletal: No clubbing, cyanosis or edema bilaterally. Full range of motion without deformity. Skin: Skin color, texture, turgor normal.  No rashes or lesions. Neurologic:  Neurovascularly intact without any focal sensory/motor deficits. Cranial nerves: II-XII intact, grossly non-focal.  Psychiatric: Alert and oriented, thought content appropriate, normal insight  Capillary Refill: Brisk,< 3 seconds   Peripheral Pulses: +2 palpable, equal bilaterally       Labs:   Recent Labs     10/05/20  0553  10/05/20  1609 10/05/20  2307 10/06/20  0509   WBC 5.5  --   --   --   --    HGB 9.2*   < > 8.8* 9.5* 9.4*   HCT 28.5*   < > 27.2* 29.4* 29.2*     --   --   --   --     < > = values in this interval not displayed. No results for input(s): NA, K, CL, CO2, BUN, CREATININE, CALCIUM, PHOS in the last 72 hours. Invalid input(s): MAGNES  No results for input(s): AST, ALT, BILIDIR, BILITOT, ALKPHOS in the last 72 hours. No results for input(s): INR in the last 72 hours. No results for input(s): Barbara Caldera in the last 72 hours.     Urinalysis: Lab Results   Component Value Date    NITRU Negative 10/01/2020    WBCUA 0-2 10/01/2020    BACTERIA 2+ 10/01/2020    RBCUA 0-2 10/01/2020    BLOODU TRACE-INTACT 10/01/2020    SPECGRAV 1.020 10/01/2020    GLUCOSEU Negative 10/01/2020       Radiology:  XR CHEST PORTABLE   Final Result      No acute disease. CT HEAD WO CONTRAST   Final Result      No acute intracranial abnormality. Assessment/Plan:    Active Hospital Problems    Diagnosis Date Noted    NSTEMI (non-ST elevated myocardial infarction) ([facility] [address] [I21.4] 10/01/2020     Assessment and plan  #NSTEMI  Trend troponin. Status post heparin drip for 48 hours  Echo no WMA. Patient is refusing left heart cath. Cardiology recommended medical management. Continue aspirin, plavix, statin, beta-blocker. #Orthostatic hypotension  Patient received metoprolol, Norvasc and hydrochlorothiazide this morning  Patient noted to have blood pressure in 60s in the chair and in 130s while laying in bed  Discussed with RN to give 500 mL bolus  Recheck orthostatic, if patient orthostatic give another 500 mm bolus  Hold Norvasc and hydrochlorothiazide    #Hypertension:  Norvasc and hydrochlorothiazide held  Continue metoprolol with hold parameters    #Chronic vertigo  Continue meclizine 25 mg 3 times daily    #Anemia chronic. Iron deficiency  Continue iron supplement    #Diabetes mellitus type 2  Metformin on hold. Continue insulin sliding scale. #COPD/asthma not in exacerbation. Continue home bronchodilators. DVT Prophylaxis: heparin  Diet: DIET CARDIAC;  Code Status: Full Code    PT/OT Eval Status: in progress    Dispo -pending blood pressure improvement. PT OT/COVID rule out and SNF placement. Discussed with social work.     This chart was likely completed using voice recognition technology and may contain unintended grammatical , phraseology,and/or punctuation errors      Mil Crooks MD

## 2020-10-06 NOTE — PROGRESS NOTES
Physical Therapy  Facility/Department: [address] [facility] [address]  Daily Treatment Note  NAME: Alida De La O  : 1935  MRN: [id number]    Date of Service: 10/6/2020    Discharge Recommendations:Pascale GRECO Kunaljulienne Alfaro scored a 10/24 on the AM-PAC short mobility form. Current research shows that an AM-PAC score of 17 or less is typically not associated with a discharge to the patient's home setting. Based on the patient's AM-PAC score and their current functional mobility deficits, it is recommended that the patient have 3-5 sessions per week of Physical Therapy at d/c to increase the patient's independence. Please see assessment section for further patient specific details. If patient discharges prior to next session this note will serve as a discharge summary. Please see below for the latest assessment towards goals. PT Equipment Recommendations  Equipment Needed: (defer)    Assessment   Assessment: Pt demo slow improvement this date as she was able to tolerate limited gait and txfers with max encouragment. Pt quite anxious throughout session and required increased time to perform all tasks. Pt demo mobility well below her reported baseline. Pt would benefit from continued skilled IP PT at discharge to maximize her functional independence prior to d/c home. Treatment Diagnosis: impaired functional mobility due to dizziness  PT Education: PT Role;Plan of Care; Functional Mobility Training  Patient Education: Pt verbalized understanding. Would benefit from reinforcement. REQUIRES PT FOLLOW UP: Yes  Activity Tolerance  Activity Tolerance: Patient limited by fatigue; Other  Activity Tolerance: Activity tolerance limited by anxiety/intermittent complaint of dizziness     Patient Diagnosis(es): The encounter diagnosis was NSTEMI (non-ST elevated myocardial infarction) ([facility] [address] has a past medical history of Asthma, COPD exacerbation ([facility] [address] Diabetes mellitus ([facility] [address] Hyperlipidemia, and Hypertension.    has a past surgical history that includes brain surgery; back surgery; Hysterectomy; and Tympanoplasty. Restrictions  Position Activity Restriction  Other position/activity restrictions: Up at tolerated, envir precautions     Subjective   General  Chart Reviewed: Yes  Additional Pertinent Hx: Pt is a 83 y/o female who presented to [facility] on 10[facility]1 with an NSTEMI and dizziness. 10/1 Head CT (-) acute bleed, XR Chest (-). PMHx: HTN, COPD, DM, vertigo, ear and brain surgical hx, chronic pain. Family / Caregiver Present: No  Subjective  Subjective: Pt found supine in bed and agreeable to PT with encouragement. \" I feel a little better today. \"  Pain Screening  Patient Currently in Pain: Denies         Orientation  Orientation  Overall Orientation Status: Within Functional Limits     Objective   Bed mobility  Supine to Sit: Stand by assistance(HOB up and use of rail; slow and effortful)  Scooting: Stand by assistance(slow and effortful)     Transfers  Sit to Stand: Dependent/Total(min assist x2 with max vc/encouragement)  Stand to sit: Dependent/Total(min assist x2 with vc for hand placement)  Comment: Pt moving very slowly and quite anxious during txfers. Ambulation    Device: No Device  Assistance: Dependent/Total(min assist x2)  Quality of Gait: slow james with decreased step length/height; pt quite anxious throughout and req max vc/encouragement to push herself  Distance: 4 steps to chair  Stairs/Curb  Stairs? : (pt too anxious to attempt steps this am)     Balance  Sitting - Static: Fair;+  Sitting - Dynamic: Fair;+  Standing - Static: Poor  Standing - Dynamic: Poor  Comments: Pt sat EOB x approx 20 mins with B UE support due to her c/o dizziness intermittently.                AM-PAC Score  AM-PAC Inpatient Mobility Raw Score : 10 (10/06/20 1204)  AM-PAC Inpatient T-Scale Score : 32.29 (10/06/20 1204)  Mobility Inpatient CMS 0-100% Score: 76.75 (10/06/20 1204)  Mobility Inpatient CMS G-Code Modifier : CL (10/06/20 1204)          Goals  Short term goals  Time Frame for Short term goals: discharge  Short term goal 1: sit <> supine independent   ongoing  Short term goal 2: Pt will be able to maintain static sitting for 1 minute   met 10/6  Short term goal 3: sit to/from stand CGA  Short term goal 4: ambulate 40 ft with LRAD CGA  Patient Goals   Patient goals : to return home    Plan    Plan  Times per week: 2-5  Times per day: Daily  Current Treatment Recommendations: Transfer Training, Gait Training, Stair training, Functional Mobility Training, Balance Training, Home Exercise Program, Safety Education & Training, Strengthening  Safety Devices  Type of devices: Left in chair, Call light within reach, Chair alarm in place, Nurse notified     Therapy Time   Individual Concurrent Group Co-treatment   Time In 1120         Time Out 1200         Minutes 40              Timed Code Treatment Minutes: 40      Total Treatment Minutes:  [address], PT

## 2020-10-06 NOTE — CARE COORDINATION
Referred to patient for d/c planning. Patient encouraged to participate in PT/OT. Refused yesterday. Facility to attempt to precert today with updated PT/OT notes. Plan for [facility] on d/c. MD updated patient will need COVID r/o to go to SNF. Electronically signed by GRETA Otto on 10/6/2020 at 12:20 PM     UPDATE: Precert obtained. Await COVID.   Electronically signed by GRETA Otto, LISW-S on 10/6/2020 at 3:39 PM

## 2020-10-06 NOTE — PLAN OF CARE
Problem: Skin Integrity:  Goal: Absence of new skin breakdown  Description: Absence of new skin breakdown  10/6/2020 1905 by Lacey Anne RN  Outcome: Ongoing  Note: Pt has shown no new signs of skin breakdown. Pt repositions self frequently and off loads bottom most of the time. Skin is intact. Pt is continent and skin clean thoroughly after use of bedpan. Problem: Falls - Risk of:  Goal: Will remain free from falls  Description: Will remain free from falls  10/6/2020 1905 by Lacey Anne RN  Outcome: Ongoing  Note: Pt at risk for falls. Fall precautions in place. Call light and personal needs within reach. Pt has made no attempts to get up unassisted and has called for all needs. Room door open unless patient requests otherwise.

## 2020-10-07 LAB
GLUCOSE BLD-MCNC: 161 MG/DL (ref 70–99)
GLUCOSE BLD-MCNC: 183 MG/DL (ref 70–99)
GLUCOSE BLD-MCNC: 183 MG/DL (ref 70–99)
GLUCOSE BLD-MCNC: 232 MG/DL (ref 70–99)
GLUCOSE BLD-MCNC: 356 MG/DL (ref 70–99)
GLUCOSE BLD-MCNC: 484 MG/DL (ref 70–99)
HCT VFR BLD CALC: 27.2 % (ref 36–48)
HCT VFR BLD CALC: 27.8 % (ref 36–48)
HCT VFR BLD CALC: 29.9 % (ref 36–48)
HEMOGLOBIN: 8.7 G/DL (ref 12–16)
HEMOGLOBIN: 8.9 G/DL (ref 12–16)
HEMOGLOBIN: 9.4 G/DL (ref 12–16)
PERFORMED ON: ABNORMAL

## 2020-10-07 PROCEDURE — 6370000000 HC RX 637 (ALT 250 FOR IP): Performed by: INTERNAL MEDICINE

## 2020-10-07 PROCEDURE — 2580000003 HC RX 258: Performed by: INTERNAL MEDICINE

## 2020-10-07 PROCEDURE — 85018 HEMOGLOBIN: CPT

## 2020-10-07 PROCEDURE — 6360000002 HC RX W HCPCS: Performed by: INTERNAL MEDICINE

## 2020-10-07 PROCEDURE — 1200000000 HC SEMI PRIVATE

## 2020-10-07 PROCEDURE — 36415 COLL VENOUS BLD VENIPUNCTURE: CPT

## 2020-10-07 PROCEDURE — 94640 AIRWAY INHALATION TREATMENT: CPT

## 2020-10-07 PROCEDURE — 85014 HEMATOCRIT: CPT

## 2020-10-07 RX ORDER — FAMOTIDINE 20 MG/1
20 TABLET, FILM COATED ORAL 2 TIMES DAILY
Status: DISCONTINUED | OUTPATIENT
Start: 2020-10-07 | End: 2020-10-08 | Stop reason: HOSPADM

## 2020-10-07 RX ADMIN — FERROUS SULFATE TAB 325 MG (65 MG ELEMENTAL FE) 325 MG: 325 (65 FE) TAB at 08:44

## 2020-10-07 RX ADMIN — CYCLOBENZAPRINE HYDROCHLORIDE 5 MG: 10 TABLET, FILM COATED ORAL at 22:47

## 2020-10-07 RX ADMIN — ALBUTEROL SULFATE 2.5 MG: 2.5 SOLUTION RESPIRATORY (INHALATION) at 12:02

## 2020-10-07 RX ADMIN — INSULIN LISPRO 5 UNITS: 100 INJECTION, SOLUTION INTRAVENOUS; SUBCUTANEOUS at 14:29

## 2020-10-07 RX ADMIN — Medication 10 ML: at 00:24

## 2020-10-07 RX ADMIN — CYCLOBENZAPRINE HYDROCHLORIDE 5 MG: 10 TABLET, FILM COATED ORAL at 00:24

## 2020-10-07 RX ADMIN — ALBUTEROL SULFATE 2.5 MG: 2.5 SOLUTION RESPIRATORY (INHALATION) at 16:03

## 2020-10-07 RX ADMIN — INSULIN LISPRO 1 UNITS: 100 INJECTION, SOLUTION INTRAVENOUS; SUBCUTANEOUS at 08:45

## 2020-10-07 RX ADMIN — ALBUTEROL SULFATE 2.5 MG: 2.5 SOLUTION RESPIRATORY (INHALATION) at 21:58

## 2020-10-07 RX ADMIN — ASPIRIN 81 MG: 81 TABLET, CHEWABLE ORAL at 08:44

## 2020-10-07 RX ADMIN — ACETAMINOPHEN ORAL SOLUTION 650 MG: 650 SOLUTION ORAL at 22:47

## 2020-10-07 RX ADMIN — MECLIZINE HYDROCHLORIDE 25 MG: 25 TABLET ORAL at 08:44

## 2020-10-07 RX ADMIN — INSULIN LISPRO 2 UNITS: 100 INJECTION, SOLUTION INTRAVENOUS; SUBCUTANEOUS at 18:11

## 2020-10-07 RX ADMIN — FAMOTIDINE 20 MG: 20 TABLET, FILM COATED ORAL at 00:24

## 2020-10-07 RX ADMIN — ARFORMOTEROL TARTRATE 15 MCG: 15 SOLUTION RESPIRATORY (INHALATION) at 21:58

## 2020-10-07 RX ADMIN — BUDESONIDE 250 MCG: 0.25 SUSPENSION RESPIRATORY (INHALATION) at 08:10

## 2020-10-07 RX ADMIN — MECLIZINE HYDROCHLORIDE 25 MG: 25 TABLET ORAL at 22:48

## 2020-10-07 RX ADMIN — Medication 10 ML: at 22:43

## 2020-10-07 RX ADMIN — Medication 10 ML: at 08:45

## 2020-10-07 RX ADMIN — BUDESONIDE 250 MCG: 0.25 SUSPENSION RESPIRATORY (INHALATION) at 21:58

## 2020-10-07 RX ADMIN — INSULIN LISPRO 1 UNITS: 100 INJECTION, SOLUTION INTRAVENOUS; SUBCUTANEOUS at 23:10

## 2020-10-07 RX ADMIN — ACETAMINOPHEN ORAL SOLUTION 650 MG: 650 SOLUTION ORAL at 03:57

## 2020-10-07 RX ADMIN — MECLIZINE HYDROCHLORIDE 25 MG: 25 TABLET ORAL at 14:32

## 2020-10-07 RX ADMIN — FAMOTIDINE 20 MG: 20 TABLET, FILM COATED ORAL at 22:47

## 2020-10-07 RX ADMIN — ONDANSETRON 4 MG: 2 INJECTION INTRAMUSCULAR; INTRAVENOUS at 22:47

## 2020-10-07 RX ADMIN — CLOPIDOGREL BISULFATE 75 MG: 75 TABLET ORAL at 08:44

## 2020-10-07 RX ADMIN — ARFORMOTEROL TARTRATE 15 MCG: 15 SOLUTION RESPIRATORY (INHALATION) at 08:15

## 2020-10-07 RX ADMIN — ACETAMINOPHEN ORAL SOLUTION 650 MG: 650 SOLUTION ORAL at 10:40

## 2020-10-07 RX ADMIN — ALBUTEROL SULFATE 2.5 MG: 2.5 SOLUTION RESPIRATORY (INHALATION) at 08:05

## 2020-10-07 RX ADMIN — MECLIZINE HYDROCHLORIDE 25 MG: 25 TABLET ORAL at 00:24

## 2020-10-07 RX ADMIN — METOPROLOL SUCCINATE 25 MG: 25 TABLET, EXTENDED RELEASE ORAL at 08:44

## 2020-10-07 ASSESSMENT — PAIN DESCRIPTION - PROGRESSION
CLINICAL_PROGRESSION: GRADUALLY WORSENING
CLINICAL_PROGRESSION: NOT CHANGED

## 2020-10-07 ASSESSMENT — PAIN SCALES - GENERAL
PAINLEVEL_OUTOF10: 0
PAINLEVEL_OUTOF10: 3
PAINLEVEL_OUTOF10: 7
PAINLEVEL_OUTOF10: 0
PAINLEVEL_OUTOF10: 8
PAINLEVEL_OUTOF10: 0
PAINLEVEL_OUTOF10: 6
PAINLEVEL_OUTOF10: 3
PAINLEVEL_OUTOF10: 1
PAINLEVEL_OUTOF10: 0

## 2020-10-07 ASSESSMENT — PAIN DESCRIPTION - ONSET
ONSET: GRADUAL
ONSET: ON-GOING

## 2020-10-07 ASSESSMENT — PAIN DESCRIPTION - LOCATION
LOCATION: LEG;ABDOMEN
LOCATION: HEAD

## 2020-10-07 ASSESSMENT — PAIN DESCRIPTION - FREQUENCY
FREQUENCY: CONTINUOUS
FREQUENCY: INTERMITTENT

## 2020-10-07 ASSESSMENT — PAIN DESCRIPTION - PAIN TYPE
TYPE: CHRONIC PAIN
TYPE: CHRONIC PAIN

## 2020-10-07 ASSESSMENT — PAIN - FUNCTIONAL ASSESSMENT: PAIN_FUNCTIONAL_ASSESSMENT: PREVENTS OR INTERFERES SOME ACTIVE ACTIVITIES AND ADLS

## 2020-10-07 ASSESSMENT — PAIN DESCRIPTION - DESCRIPTORS
DESCRIPTORS: ACHING
DESCRIPTORS: HEADACHE

## 2020-10-07 ASSESSMENT — PAIN DESCRIPTION - ORIENTATION: ORIENTATION: LOWER

## 2020-10-07 NOTE — CARE COORDINATION
CM following for  D/c planning ,  [employer] pre cert  Received  : Covid testing for screening: pending, sent yest  9365: pending as of  1130  AM      HENS submitted. Document ID: [id number]        Electronically signed by Erum Osullivan RN on 10/7/2020 at 11:23 AM       Erum Osullivan RN Case Manager  [facility]  [address] [place] [address].   [facility] [address] [phone]  Fax [phone]

## 2020-10-07 NOTE — PROGRESS NOTES
(Oral)   Resp 18   Ht 5' 5\" (1.651 m)   Wt 190 lb (86.2 kg)   SpO2 95%   BMI 31.62 kg/m²     General appearance: No apparent distress, appears stated age and cooperative. HEENT: Pupils equal, round, and reactive to light. Conjunctivae/corneas clear. Neck: Supple, with full range of motion. No jugular venous distention. Trachea midline. Respiratory:  Normal respiratory effort. Clear to auscultation, bilaterally without Rales/Wheezes/Rhonchi. Cardiovascular: Regular rate and rhythm with normal S1/S2 without murmurs, rubs or gallops. Abdomen: Soft, non-tender, non-distended with normal bowel sounds. Musculoskeletal: No clubbing, cyanosis or edema bilaterally. Full range of motion without deformity. Skin: Skin color, texture, turgor normal.  No rashes or lesions. Neurologic:  Neurovascularly intact without any focal sensory/motor deficits. Cranial nerves: II-XII intact, grossly non-focal.  Psychiatric: Alert and oriented, thought content appropriate, normal insight  Capillary Refill: Brisk,< 3 seconds   Peripheral Pulses: +2 palpable, equal bilaterally       Labs:   Recent Labs     10/05/20  0553  10/07/20  0005 10/07/20  0348 10/07/20  0930   WBC 5.5  --   --   --   --    HGB 9.2*   < > 8.9* 8.7* 9.4*   HCT 28.5*   < > 27.8* 27.2* 29.9*     --   --   --   --     < > = values in this interval not displayed. No results for input(s): NA, K, CL, CO2, BUN, CREATININE, CALCIUM, PHOS in the last 72 hours. Invalid input(s): MAGNES  No results for input(s): AST, ALT, BILIDIR, BILITOT, ALKPHOS in the last 72 hours. No results for input(s): INR in the last 72 hours. No results for input(s): Reyne Vietnamese in the last 72 hours.     Urinalysis:      Lab Results   Component Value Date    NITRU Negative 10/01/2020    WBCUA 0-2 10/01/2020    BACTERIA 2+ 10/01/2020    RBCUA 0-2 10/01/2020    BLOODU TRACE-INTACT 10/01/2020    SPECGRAV 1.020 10/01/2020    GLUCOSEU Negative 10/01/2020       Radiology:  XR CHEST PORTABLE   Final Result      No acute disease. CT HEAD WO CONTRAST   Final Result      No acute intracranial abnormality. Assessment/Plan:    Active Hospital Problems    Diagnosis Date Noted    NSTEMI (non-ST elevated myocardial infarction) ([facility] [address] [I21.4] 10/01/2020     Assessment and plan  #NSTEMI  Trend troponin. Status post heparin drip for 48 hours  Echo no WMA. Patient is refusing left heart cath. Cardiology recommended medical management. Continue aspirin, plavix, statin, beta-blocker. #Orthostatic hypotension  Patient received metoprolol, Norvasc and hydrochlorothiazide this morning  Patient noted to have blood pressure in 60s in the chair and in 130s while laying in bed  Received 500 ml bolus. Blood pressure better. Pulse did increase by 12 points from lying to standing. We will continue to hold Norvasc and hydrochlorothiazide    #Hypertension:  Norvasc and hydrochlorothiazide held  Continue metoprolol with hold parameters    #Chronic vertigo  Continue meclizine 25 mg 3 times daily    #Anemia chronic. Iron deficiency  Continue iron supplement    #Diabetes mellitus type 2  Metformin on hold. Continue insulin sliding scale. #COPD/asthma not in exacerbation. Continue home bronchodilators. #GERD  Pepcid increased to twice daily    DVT Prophylaxis: heparin  Diet: DIET CARDIAC;  Code Status: Full Code    PT/OT Eval Status: in progress    Dispo -pending COVID rule out for SNF placement. Discussed with social work.     This chart was likely completed using voice recognition technology and may contain unintended grammatical , phraseology,and/or punctuation errors      Doreen Jacobson MD

## 2020-10-07 NOTE — PLAN OF CARE
Problem: Skin Integrity:  Goal: Will show no infection signs and symptoms  Description: Will show no infection signs and symptoms  10/7/2020 1115 by Jorge Luis Hermosillo RN  Outcome: Ongoing  Note: Pt at risk for skin breakdown. Skin assessment complete. No signs of skin breakdown. Pts skin cleansed with inc cleanser. Pt repositioned in bed with pillow support. Will continue to monitor. Problem: Skin Integrity:  Goal: Absence of new skin breakdown  Description: Absence of new skin breakdown  Outcome: Ongoing  Note: No new skin breakdown noted. Will monitor. Problem: Falls - Risk of:  Goal: Will remain free from falls  Description: Will remain free from falls  10/7/2020 1115 by Jorge Luis Hermosillo RN  Outcome: Ongoing  Note: Discussed with pt importance to keep bed low and locked with alarm activated, nonskid socks on when out of bed, call light and belongings within reach- will monitor. Problem: Falls - Risk of:  Goal: Absence of physical injury  Description: Absence of physical injury  Outcome: Ongoing  Note: No new physical injury noted- will monitor. Problem: Pain:  Goal: Pain level will decrease  Description: Pain level will decrease  10/7/2020 1115 by Jorge Luis Hermosillo RN  Outcome: Ongoing  Note: Patient complains of pain at level 7/10. Patient describes pain as ache. Patient requests pain medication. Patient medicated with tylenol . Will continue to monitor.

## 2020-10-08 VITALS
OXYGEN SATURATION: 93 % | TEMPERATURE: 97.9 F | BODY MASS INDEX: 31.65 KG/M2 | HEIGHT: 65 IN | DIASTOLIC BLOOD PRESSURE: 68 MMHG | SYSTOLIC BLOOD PRESSURE: 143 MMHG | HEART RATE: 99 BPM | RESPIRATION RATE: 20 BRPM | WEIGHT: 190 LBS

## 2020-10-08 LAB
GLUCOSE BLD-MCNC: 165 MG/DL (ref 70–99)
GLUCOSE BLD-MCNC: 170 MG/DL (ref 70–99)
GLUCOSE BLD-MCNC: 296 MG/DL (ref 70–99)
HCT VFR BLD CALC: 26.3 % (ref 36–48)
HEMOGLOBIN: 8.4 G/DL (ref 12–16)
PERFORMED ON: ABNORMAL
SARS-COV-2, NAAT: NOT DETECTED

## 2020-10-08 PROCEDURE — 2580000003 HC RX 258: Performed by: INTERNAL MEDICINE

## 2020-10-08 PROCEDURE — 85018 HEMOGLOBIN: CPT

## 2020-10-08 PROCEDURE — 6370000000 HC RX 637 (ALT 250 FOR IP): Performed by: INTERNAL MEDICINE

## 2020-10-08 PROCEDURE — 97530 THERAPEUTIC ACTIVITIES: CPT

## 2020-10-08 PROCEDURE — 36415 COLL VENOUS BLD VENIPUNCTURE: CPT

## 2020-10-08 PROCEDURE — 94640 AIRWAY INHALATION TREATMENT: CPT

## 2020-10-08 PROCEDURE — 85014 HEMATOCRIT: CPT

## 2020-10-08 PROCEDURE — 6360000002 HC RX W HCPCS: Performed by: INTERNAL MEDICINE

## 2020-10-08 PROCEDURE — U0002 COVID-19 LAB TEST NON-CDC: HCPCS

## 2020-10-08 RX ORDER — METOPROLOL SUCCINATE 25 MG/1
25 TABLET, EXTENDED RELEASE ORAL DAILY
Qty: 30 TABLET | Refills: 3 | Status: SHIPPED | OUTPATIENT
Start: 2020-10-09

## 2020-10-08 RX ORDER — FERROUS SULFATE 325(65) MG
325 TABLET ORAL
Qty: 30 TABLET | Refills: 3 | Status: SHIPPED | OUTPATIENT
Start: 2020-10-08

## 2020-10-08 RX ORDER — MECLIZINE HYDROCHLORIDE 25 MG/1
25 TABLET ORAL 3 TIMES DAILY
Qty: 30 TABLET | Refills: 0 | Status: SHIPPED | OUTPATIENT
Start: 2020-10-08 | End: 2020-10-18

## 2020-10-08 RX ORDER — ASPIRIN 81 MG/1
81 TABLET, CHEWABLE ORAL DAILY
Qty: 30 TABLET | Refills: 3 | Status: SHIPPED | OUTPATIENT
Start: 2020-10-08

## 2020-10-08 RX ORDER — LIDOCAINE 4 G/G
1 PATCH TOPICAL DAILY
Qty: 1 BOX | Refills: 1 | Status: SHIPPED | OUTPATIENT
Start: 2020-10-08 | End: 2020-10-23

## 2020-10-08 RX ORDER — POTASSIUM CHLORIDE 20 MEQ/1
20 TABLET, EXTENDED RELEASE ORAL EVERY OTHER DAY
Qty: 60 TABLET | Refills: 3 | Status: SHIPPED | OUTPATIENT
Start: 2020-10-08

## 2020-10-08 RX ORDER — CLOPIDOGREL BISULFATE 75 MG/1
75 TABLET ORAL DAILY
Qty: 30 TABLET | Refills: 3 | Status: SHIPPED | OUTPATIENT
Start: 2020-10-08

## 2020-10-08 RX ORDER — FAMOTIDINE 20 MG/1
20 TABLET, FILM COATED ORAL 2 TIMES DAILY
Qty: 60 TABLET | Refills: 3 | Status: SHIPPED | OUTPATIENT
Start: 2020-10-08

## 2020-10-08 RX ADMIN — POTASSIUM CHLORIDE 20 MEQ: 1500 TABLET, EXTENDED RELEASE ORAL at 09:14

## 2020-10-08 RX ADMIN — FAMOTIDINE 20 MG: 20 TABLET, FILM COATED ORAL at 09:13

## 2020-10-08 RX ADMIN — CLOPIDOGREL BISULFATE 75 MG: 75 TABLET ORAL at 09:13

## 2020-10-08 RX ADMIN — INSULIN LISPRO 3 UNITS: 100 INJECTION, SOLUTION INTRAVENOUS; SUBCUTANEOUS at 17:58

## 2020-10-08 RX ADMIN — ASPIRIN 81 MG: 81 TABLET, CHEWABLE ORAL at 09:14

## 2020-10-08 RX ADMIN — MECLIZINE HYDROCHLORIDE 25 MG: 25 TABLET ORAL at 13:32

## 2020-10-08 RX ADMIN — FERROUS SULFATE TAB 325 MG (65 MG ELEMENTAL FE) 325 MG: 325 (65 FE) TAB at 09:14

## 2020-10-08 RX ADMIN — ACETAMINOPHEN ORAL SOLUTION 650 MG: 650 SOLUTION ORAL at 13:49

## 2020-10-08 RX ADMIN — ALBUTEROL SULFATE 2.5 MG: 2.5 SOLUTION RESPIRATORY (INHALATION) at 12:32

## 2020-10-08 RX ADMIN — METOPROLOL SUCCINATE 25 MG: 25 TABLET, EXTENDED RELEASE ORAL at 09:13

## 2020-10-08 RX ADMIN — MECLIZINE HYDROCHLORIDE 25 MG: 25 TABLET ORAL at 09:13

## 2020-10-08 RX ADMIN — INSULIN LISPRO 1 UNITS: 100 INJECTION, SOLUTION INTRAVENOUS; SUBCUTANEOUS at 11:50

## 2020-10-08 RX ADMIN — ALBUTEROL SULFATE 2.5 MG: 2.5 SOLUTION RESPIRATORY (INHALATION) at 15:45

## 2020-10-08 RX ADMIN — CYCLOBENZAPRINE HYDROCHLORIDE 5 MG: 10 TABLET, FILM COATED ORAL at 11:48

## 2020-10-08 RX ADMIN — Medication 10 ML: at 09:14

## 2020-10-08 RX ADMIN — INSULIN LISPRO 1 UNITS: 100 INJECTION, SOLUTION INTRAVENOUS; SUBCUTANEOUS at 09:18

## 2020-10-08 ASSESSMENT — PAIN SCALES - GENERAL
PAINLEVEL_OUTOF10: 6
PAINLEVEL_OUTOF10: 4
PAINLEVEL_OUTOF10: 0
PAINLEVEL_OUTOF10: 0

## 2020-10-08 NOTE — PROGRESS NOTES
Pt aaox4, iv removed, fresh diaper placed on pt. Pt ate dinner. Transporters at bedside to take her to [facility]-pt aware. Respiratory at bedside to give breathing treatment per pt request. Belongings sent with pt and pt has her dentures in her mouth. Report given to eva avila.

## 2020-10-08 NOTE — PROGRESS NOTES
Occupational Therapy  Facility/Department: [address]  Daily Treatment Note  NAME: Siena Hernández  : 1935  MRN: [id number]    Date of Service: 10/8/2020    Discharge Recommendations:    Siena Hernández scored a 16/24 on the AM-PAC ADL Inpatient form. Current research shows that an AM-PAC score of 17 or less is typically not associated with a discharge to the patient's home setting. Based on the patient's AM-PAC score and their current ADL deficits, it is recommended that the patient have 3-5 sessions per week of Occupational Therapy at d/c to increase the patient's independence. Please see assessment section for further patient specific details. If patient discharges prior to next session this note will serve as a discharge summary. Please see below for the latest assessment towards goals. OT Equipment Recommendations  Equipment Needed: No  Other: defer to next level of care    Assessment   Performance deficits / Impairments: Decreased functional mobility ; Decreased ADL status; Decreased safe awareness;Decreased endurance;Decreased balance  Assessment: Pt agreeable to OOB after min encouragement. Pt cont with anxiety and moves very slowly. Cont OT tx per plan of care. Treatment Diagnosis: Impaired ADLs and functional mobility  Prognosis: Good  OT Education: OT Role;Plan of Care  Patient Education: Pt verbalized understanding  REQUIRES OT FOLLOW UP: Yes  Activity Tolerance  Activity Tolerance: Treatment limited secondary to medical complications (free text)  Activity Tolerance: Limited by anxiety  Safety Devices  Safety Devices in place: Yes  Type of devices: Left in chair;Call light within reach; Chair alarm in place;Nurse notified       Restrictions  Position Activity Restriction  Other position/activity restrictions: Up at tolerated, envir precautions  Subjective   General  Chart Reviewed: Yes  Patient assessed for rehabilitation services?: Yes  Additional Pertinent Hx: Pt is 81 y/o F to ED w/ c/o dizziness. Hospital course Head CT (-), CXR (-). PMH significant for Asthma, COPD exacerbation, Diabetes mellitus, Hyperlipidemia, and Hypertension  Family / Caregiver Present: No  Referring Practitioner: Steve Vyas MD  Diagnosis: NSTEMI  Subjective  Subjective: Pt in bed upon entry. Maybe later I'll go to the bathroom. Vital Signs  Patient Currently in Pain: No   Objective    ADL  Grooming: Setup(to wipe face and hands seated in chair)        Balance  Sitting Balance: Stand by assistance  Standing Balance: Moderate assistance  Standing Balance  Activity: transfer  Bed mobility  Supine to Sit: Stand by assistance(HOB up and use of rail; slow and effortful)  Scooting: Contact guard assistance  Transfers  Stand Pivot Transfers: Moderate assistance(bed to chair)  Sit to stand:  Moderate assistance  Stand to sit: Minimal assistance                       Cognition  Overall Cognitive Status: (anxiety)                                         Plan   Plan  Times per week: 2-5  Times per day: Daily  Current Treatment Recommendations: Functional Mobility Training, Self-Care / ADL, Balance Training  AM-PAC Score        AM-[facility] Inpatient Daily Activity Raw Score: 16 (10/06/20 1204)  AM-PAC Inpatient ADL T-Scale Score : 35.96 (10/06/20 1204)  ADL Inpatient CMS 0-100% Score: 53.32 (10/06/20 1204)  ADL Inpatient CMS G-Code Modifier : CK (10/06/20 1204)    Goals  Short term goals  Time Frame for Short term goals: discharge  Short term goal 1: Pt to participate in [facility] transfer    -MET 10/6/2020-  Short term goal 2: Pt to participate in total body dressing assessment  Short term goal 3: Pt to tolerate x1 minute standing with CGA  Short term goal 4: Transfer to/from toilet with CG  Patient Goals   Patient goals : to go home       Therapy Time   Individual Concurrent Group Co-treatment   Time In 1120         Time Out 1146         Minutes 26         Timed Code Treatment Minutes: 26 Minutes    Total Treatment Katrina Holley Kallie Flores, [address] [place] [address]

## 2020-10-08 NOTE — DISCHARGE INSTR - COC
Continuity of Care Form    Patient Name: Siena Hernández   :  1935  MRN:  [id number]    Admit date:  10/1/2020  Discharge date:  10/8/2020    Code Status Order: Full Code   Advance Directives:   Advance Care Flowsheet Documentation       Date/Time Healthcare Directive Type of Healthcare Directive Copy in [address] Agent's Name Healthcare Agent's Phone Number    10/01/20 2033  No, patient does not have an advance directive for healthcare treatment -- -- -- -- --            Admitting Physician:  No admitting provider for patient encounter. PCP: Iban Ovalle MD    Discharging Nurse: [facility] Unit/Room#: 2673/2599-29  Discharging Unit Phone Number: ***    Emergency Contact:   Extended Emergency Contact Information  Primary Emergency Contact: [address] [place] [address] of [address] [place] [address] Phone: [phone]  Mobile Phone: [phone]  Relation: Child    Past Surgical History:  Past Surgical History:   Procedure Laterality Date    BACK SURGERY      BRAIN SURGERY      HYSTERECTOMY      TYMPANOPLASTY         Immunization History: There is no immunization history on file for this patient. 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 901    Cervical radiculopathy M54.12    COPD exacerbation ([facility]) J44.1    Pleuritic chest pain R07.81    Acute asthma exacerbation J45. [address] [facility] NSTEMI (non-ST elevated myocardial infarction) ([facility] [address] I21.4       Isolation/Infection:   Isolation            No Isolation          Patient Infection Status       Infection Onset Added Last Indicated Last Indicated By Review Planned Expiration Resolved Resolved By    COVID-19 Rule Out 10/06/20 10/06/20 10/06/20 COVID-19 (Ordered) 10/13/20 10/20/20              Nurse Assessment:  Last Vital Signs: BP (!) 162/67   Pulse 92   Temp 97.4 °F (36.3 °C) (Oral)   Resp 18   Ht 5' 5\" (1.651 m)   Wt 190 lb (86.2 kg)   SpO2 97%   BMI 31.62 kg/m²     Last documented pain score (0-10 scale): Pain Level: 0  Last Weight:   Wt Readings from Last 1 Encounters:   10/07/20 190 lb (86.2 kg)     Mental Status:  oriented, alert, coherent, logical, thought processes intact and able to concentrate and follow conversation    IV Access:  - None    Nursing Mobility/ADLs:  Walking   Assisted  Transfer  Assisted  Bathing  Assisted  Dressing  Assisted  Toileting  Assisted  Feeding  Assisted  Med Admin  Assisted  Med Delivery   whole    Wound Care Documentation and Therapy:        Elimination:  Continence:   · Bowel: Yes  · Bladder: Yes  Urinary Catheter: None   Colostomy/Ileostomy/Ileal Conduit: No       Date of Last BM: ***    Intake/Output Summary (Last 24 hours) at 10/8/2020 1003  Last data filed at 10/7/2020 1020  Gross per 24 hour   Intake 240 ml   Output --   Net 240 ml     I/O last 3 completed shifts: In: 26 [P.O.:440]  Out: -     Safety Concerns: At Risk for Falls    Impairments/Disabilities:      Hearing    Nutrition Therapy:  Current Nutrition Therapy:   - Oral Diet:  Low Sodium (2gm)    Routes of Feeding: Oral  Liquids: Thin Liquids  Daily Fluid Restriction: no  Last Modified Barium Swallow with Video (Video Swallowing Test): not done    Treatments at the Time of Hospital Discharge:   Respiratory Treatments: ***  Oxygen Therapy:  is on oxygen at *** L/min per nasal cannula.   Ventilator:    - No ventilator support    Rehab Therapies: Physical Therapy and Occupational Therapy  Weight Bearing Status/Restrictions: No weight bearing restirctions  Other Medical Equipment (for information only, NOT a DME order):  wheelchair, cane, walker, bedside commode and hospital bed  Other Treatments: ***    Patient's personal belongings (please select all that are sent with patient):  Dentures upper and lower, Jewelry    RN SIGNATURE:  Electronically signed by Stormy Pike RN on 10/8/20 at 10:07 AM EDT    CASE MANAGEMENT/SOCIAL WORK SECTION    Inpatient Status Date: inpatient    Readmission Risk Assessment Score:  Readmission Risk              Risk of Unplanned Readmission:        13           Discharging to Facility/ Agency   · Name: [place]  · [place]  · Phone:[phone]  · EGU:[phone]        / signature: Electronically signed by GRETA Oneil on 10/8/20 at 4:35 PM EDT    PHYSICIAN SECTION    Prognosis: Fair    Condition at Discharge: Stable    Rehab Potential (if transferring to Rehab): Fair    Recommended Labs or Other Treatments After Discharge: Monitor BP. HCTZ held due to hypotension. May resume if orthostatics okay. Follow up with cardiology and PCP. Physician Certification: I certify the above information and transfer of Veronica Holly  is necessary for the continuing treatment of the diagnosis listed and that she requires Micah Machado for less 30 days.      Update Admission H&P: No change in H&P    PHYSICIAN SIGNATURE:  Electronically signed by Max Green MD on 10/8/20 at 3:03 PM EDT

## 2020-10-08 NOTE — PLAN OF CARE
Problem: Skin Integrity:  Goal: Will show no infection signs and symptoms  Description: Will show no infection signs and symptoms  Outcome: Ongoing  Note: Skin assessed and pt turned/repositioned for comfort and Q 2 to avoid breakdown. Skincare provided to keep clean and dry. Will continue to assess skin and intervene to avoid skin breakdown. Problem: Falls - Risk of:  Goal: Will remain free from falls  Description: Will remain free from falls  Outcome: Ongoing  Note: Pt is aware of abilities and uses call light for needs. Bedside table, call light and needs within reach. Will continue to round on pt for safety.       Problem: Pain:  Goal: Pain level will decrease  Description: Pain level will decrease  Outcome: Ongoing

## 2020-10-08 NOTE — CARE COORDINATION
Case Management Assessment            Discharge Note                    Date / Time of Note: 10/8/2020 5:37 PM                  Discharge Note Completed by: Deysi Watson    Patient Name: Ann-Marie Diez   YOB: 1935  Diagnosis: NSTEMI (non-ST elevated myocardial infarction) [facility]) [I21.4]  NSTEMI (non-ST elevated myocardial infarction) [facility]) [I21.4]   Date / Time: 10/1/2020  1:13 PM    Current PCP: Hesham Hurtado MD  Clinic patient: No    Hospitalization in the last 30 days: Yes    Advance Directives:  Code Status: Full Code  [place] DNR form completed and on chart: No    Financial:  Payor: Hayes Curling / Plan: Nimisha Ware PPO / Product Type: Medicare /      Pharmacy:    [facility] [address] LUISA REID [address] [phone] Sierra Malik [phone] [address]  Phone: [phone] Fax: [phone]      Assistance purchasing medications?: Potential Assistance Purchasing Medications: No  Assistance provided by Case Management: None at this time    Does patient want to participate in local refill/ meds to beds program?: No    Meds To Beds General Rules:  1. Can ONLY be done Monday- Friday between 8:30am-5pm  2. Prescription(s) must be in pharmacy by 3pm to be filled same day  3. Copy of patient's insurance/ prescription drug card and patient face sheet must be sent along with the prescription(s)  4. Cost of Rx cannot be added to hospital bill. If financial assistance is needed, please contact unit  or ;  or  CANNOT provide pharmacy voucher for patients co-pays  5.  Patients can then  the prescription on their way out of the hospital at discharge, or pharmacy can deliver to the bedside if staff is available. (payment due at time of pick-up or delivery - cash, check, or card accepted)     Able to afford home medications/ co-pay costs: Yes    ADLS:  Current PT AM-PAC Score: 10 /24  Current OT AM-PAC Score: 16 /24      DISCHARGE Disposition: [place] (SNF): [facility] Phone: [phone] Fax: [phone]    LOC at discharge: Skilled  YUN Completed: Yes    Notification completed in HENS/PAS?:  Yes : CM has completed HENS online through secure website for SNF admission at [address] [place] [address] Document ID #: [id number]    IMM Completed:   Yes, Case management has presented and reviewed IMM letter #2 to the patient and/or family/ POA. Patient and/or family/POA verbalized understanding of their medicare rights and appeal process if needed. Patient and/or family/POA has signed, initialed and placed today's date (10/8/2020) and time (1700) on IMM letter #2 on the the appropriate lines. Patient and/or family/POA, copy of letter offered and they are aware that this original copy of IMM letter #2 is available prior to discharge from the paper chart on the unit. Electronic documentation has been entered into epic for IMM letter #2 and original paper copy has been added to the paper chart at the nurses station. Transportation:  Transportation PLAN for discharge: EMS transportation   Mode of Transport: Ambulance stretcher - BLS  Reason for medical transport: Bed confined: Meets the following criteria 1) unable to get out of bed without assistance or ambulate, 2) unable to safely sit up in a wheelchair, 3) unable to maintain erect seating position in a chair for time needed for transport  Name of [employer]: [employer]: [phone]  Time of Transport: 1700      Transport form completed: Yes    Referrals made at [facility] for outpatient continued care:  Not Applicable    Additional CM Notes: referred to patient for d/c to SNF at [facility]. Patient notified and agreeable. Facility notified and agreeable. Paperwork completed and faxed. PASARR completed. RN to call report. [facility] unable to transport. [place] to transport at 1700. No other needs at this itme.     The Plan for Transition of Care is related to the following treatment goals of NSTEMI (non-ST elevated myocardial infarction) [facility]) [I21.4]  [facility]non[facility]ST [facility] [I21.4]    The Patient and/or patient representative Kelly Ball and her family were provided with a choice of provider and agrees with the discharge plan Yes    Freedom of choice list was provided with basic dialogue that supports the patient's individualized plan of care/goals and shares the quality data associated with the providers.  Yes    Care Transitions patient: No    GRETA Castillo, THOMPSONS  [facility] [employer]  Case Management Department  Ph: [phone]

## 2020-10-08 NOTE — PROGRESS NOTES
Hospitalist Progress Note      PCP: Hannah Johnson MD    Date of Admission: 10/1/2020    CC dizziness, vertigo. Hospital course  Patient is 82-year-old female with history of chronic vertigo, follows up with the ENT and came to ER yesterday for severe dizziness, severe vertigo denies any loss of consciousness chest pain or palpitations. Work-up in ER was significant for troponin of 0.24. EKG did not showed any acute changes. Patient was started on heparin drip. Subjective  Patient's dizziness improving. Feeling little short of breath, waiting for her breathing treatment and physical therapy.      Medications:  Reviewed    Infusion Medications    dextrose       Scheduled Medications    famotidine  20 mg Oral BID    meclizine  25 mg Oral TID    clopidogrel  75 mg Oral Daily    ferrous sulfate  325 mg Oral Daily with breakfast    aspirin  81 mg Oral Daily    [Held by provider] amLODIPine  2.5 mg Oral BID    [Held by provider] hydroCHLOROthiazide  25 mg Oral Daily    aspirin  324 mg Oral Once    sodium chloride flush  10 mL Intravenous 2 times per day    lidocaine  1 patch Topical Daily    metoprolol succinate  25 mg Oral Daily    potassium chloride  20 mEq Oral Every Other Day    insulin lispro  0-6 Units Subcutaneous TID WC    insulin lispro  0-3 Units Subcutaneous Nightly    budesonide  0.25 mg Nebulization BID    Arformoterol Tartrate  15 mcg Nebulization BID    albuterol  2.5 mg Nebulization 4x daily     PRN Meds: acetaminophen, perflutren lipid microspheres, sodium chloride flush, polyethylene glycol, promethazine **OR** ondansetron, cyclobenzaprine, albuterol, glucose, dextrose, glucagon (rDNA), dextrose      Intake/Output Summary (Last 24 hours) at 10/8/2020 0948  Last data filed at 10/7/2020 1020  Gross per 24 hour   Intake 240 ml   Output --   Net 240 ml       Physical Exam Performed:    BP (!) 162/67   Pulse 92   Temp 97.4 °F (36.3 °C) (Oral)   Resp 18   Ht 5' 5\" (1.651 m) Wt 190 lb (86.2 kg)   SpO2 97%   BMI 31.62 kg/m²     General appearance: No apparent distress, appears stated age and cooperative. HEENT: Pupils equal, round, and reactive to light. Conjunctivae/corneas clear. Neck: Supple, with full range of motion. No jugular venous distention. Trachea midline. Respiratory:  Normal respiratory effort. Clear to auscultation, bilaterally without Rales/Wheezes/Rhonchi. Cardiovascular: Regular rate and rhythm with normal S1/S2 without murmurs, rubs or gallops. Abdomen: Soft, non-tender, non-distended with normal bowel sounds. Musculoskeletal: No clubbing, cyanosis or edema bilaterally. Full range of motion without deformity. Skin: Skin color, texture, turgor normal.  No rashes or lesions. Neurologic:  Neurovascularly intact without any focal sensory/motor deficits. Cranial nerves: II-XII intact, grossly non-focal.  Psychiatric: Alert and oriented, thought content appropriate, normal insight  Capillary Refill: Brisk,< 3 seconds   Peripheral Pulses: +2 palpable, equal bilaterally       Labs:   Recent Labs     10/07/20  0348 10/07/20  0930 10/08/20  0622   HGB 8.7* 9.4* 8.4*   HCT 27.2* 29.9* 26.3*     No results for input(s): NA, K, CL, CO2, BUN, CREATININE, CALCIUM, PHOS in the last 72 hours. Invalid input(s): MAGNES  No results for input(s): AST, ALT, BILIDIR, BILITOT, ALKPHOS in the last 72 hours. No results for input(s): INR in the last 72 hours. No results for input(s): Kira Pace in the last 72 hours. Urinalysis:      Lab Results   Component Value Date    NITRU Negative 10/01/2020    WBCUA 0-2 10/01/2020    BACTERIA 2+ 10/01/2020    RBCUA 0-2 10/01/2020    BLOODU TRACE-INTACT 10/01/2020    SPECGRAV 1.020 10/01/2020    GLUCOSEU Negative 10/01/2020       Radiology:  XR CHEST PORTABLE   Final Result      No acute disease. CT HEAD WO CONTRAST   Final Result      No acute intracranial abnormality.                     Assessment/Plan:    WIL/Michelle Sandoval 1003 Problems    Diagnosis Date Noted    NSTEMI (non-ST elevated myocardial infarction) ([facility] [I21.4] 10/01/2020     Assessment and plan  #NSTEMI  Trend troponin. Status post heparin drip for 48 hours  Echo no WMA. Patient is refusing left heart cath. Cardiology recommended medical management. Continue aspirin, plavix, statin, beta-blocker. #Orthostatic hypotension  Patient received metoprolol, Norvasc and hydrochlorothiazide  Patient noted to have blood pressure in 60s in the chair and in 130s while laying in bed 10/07/20  Received 500 ml bolus. Blood pressure better. Pulse did increase by 12 points from lying to standing 10/08/20  We will continue to hold hydrochlorothiazide  BP trending up now. Amlodipine resumed. Orthostatic vital signs ordered, discussed with nurse to obtain them    #Hypertension:  Holding hydrochlorothiazide  Amlodipine resumed  Continue metoprolol with hold parameters    #Chronic vertigo  Continue meclizine 25 mg 3 times daily    #Anemia chronic. Iron deficiency  Continue iron supplement    #Diabetes mellitus type 2  Metformin on hold. Continue insulin sliding scale. #COPD/asthma not in exacerbation. Continue home bronchodilators. #GERD  Pepcid increased to twice daily    DVT Prophylaxis: heparin  Diet: DIET CARDIAC;  Code Status: Full Code    PT/OT Eval Status: in progress    Dispo -pending COVID rule out for SNF placement. Discussed with social work.     This chart was likely completed using voice recognition technology and may contain unintended grammatical , phraseology,and/or punctuation errors      Valentino Ayers MD

## 2020-10-08 NOTE — PROGRESS NOTES
Have not done orthostatic vitals yet because when I went to try earlier OT had just gotten her up to the chair and she was short of breath, and then lunch arrived, will obtain it soon.

## 2020-10-08 NOTE — CARE COORDINATION
Spoke to facility. They will accept rapid COVID test.  MD updated and ordered. Await COVID results for d/c to [facility].   Electronically signed by GRETA Woody, MINDI on 10/8/2020 at 2:01 PM

## 2020-10-09 NOTE — DISCHARGE SUMMARY
Hospital Medicine Discharge Summary    Patient ID: Aranza Raines      Patient's PCP: Jenaro Miranda MD    Admit Date: 10/1/2020     Discharge Date: 10/8/2020      Admitting Physician: No admitting provider for patient encounter. Discharge Physician: Sebastien Mckeon MD     Discharge Diagnoses: Active Hospital Problems    Diagnosis Date Noted    NSTEMI (non-ST elevated myocardial infarction) ([facility] [I21.4] 10/01/2020       The patient was seen and examined on day of discharge and this discharge summary is in conjunction with any daily progress note from day of discharge. Hospital Course:   Patient was admitted and treated for following:    #NSTEMI  Troponin was elevated on admission. Patient was started on heparin drip. Cardiology was consulted. Echo was ordered. Heparin was discontinued after 48 hours. Aspirin, Plavix, statin and beta-blocker was continued. Echo showed no wall motion abnormality. Cardiology recommended left heart catheterization but patient refused. Cardiology recommended continuing medical management. #Orthostatic hypotension  Patient received metoprolol, Norvasc and hydrochlorothiazide. Patient noted to have blood pressure in 60s in the chair and in 130s while laying in bed 10/07/20.  500 mL fluid bolus was given. Norvasc and hydrochlorothiazide was held. Orthostatics were monitored. Patient orthostatic improved. Amlodipine was resumed. Hydrochlorothiazide was held. Patient to continue amlodipine on discharge. Hydrochlorothiazide discontinued.     #Hypertension:  As above. Patient to continue amlodipine and metoprolol on discharge.     #Chronic vertigo  Meclizine was increased to 3 times daily from PRN. Patient vertigo improved.       #Anemia chronic. Iron deficiency  Iron supplementation was continued     #Diabetes mellitus type 2  Metformin was held. Carb controlled diet was given. Sliding scale insulin was administered.   Patient resume metformin on discharge       #COPD/asthma not in exacerbation. Home bronchodilators and inhalers were continued     #GERD/hiatal hernia  Patient complained of increased heartburn. Pepcid was increased to twice daily. Heartburn resolved. Patient to continue Pepcid twice daily on discharge. Physical Exam Performed:     BP (!) 143/68   Pulse 99   Temp 97.9 °F (36.6 °C) (Oral)   Resp 20   Ht 5' 5\" (1.651 m)   Wt 190 lb (86.2 kg)   SpO2 93%   BMI 31.62 kg/m²       General appearance:  No apparent distress, appears stated age and cooperative. HEENT:  Normal cephalic, atraumatic without obvious deformity. Pupils equal, round, and reactive to light. Extra ocular muscles intact. Conjunctivae/corneas clear. Neck: Supple, with full range of motion. No jugular venous distention. Trachea midline. Respiratory:  Normal respiratory effort. Clear to auscultation, bilaterally without Rales/Wheezes/Rhonchi. Cardiovascular:  Regular rate and rhythm with normal S1/S2 without murmurs, rubs or gallops. Abdomen: Soft, non-tender, non-distended with normal bowel sounds. Musculoskeletal:  No clubbing, cyanosis or edema bilaterally. Full range of motion without deformity. Skin: Skin color, texture, turgor normal.  No rashes or lesions. Neurologic:  Neurovascularly intact without any focal sensory/motor deficits. Cranial nerves: II-XII intact, grossly non-focal.  Psychiatric:  Alert and oriented, thought content appropriate, normal insight  Capillary Refill: Brisk,< 3 seconds   Peripheral Pulses: +2 palpable, equal bilaterally       Labs:  For convenience and continuity at follow-up the following most recent labs are provided:      CBC:    Lab Results   Component Value Date    WBC 5.5 10/05/2020    HGB 8.4 10/08/2020    HCT 26.3 10/08/2020     10/05/2020       Renal:    Lab Results   Component Value Date     10/01/2020    K 4.1 10/01/2020    CL 98 10/01/2020    CO2 31 10/01/2020    BUN 26 10/01/2020    CREATININE 1.0 10/01/2020    CALCIUM 9.4 10/01/2020         Significant Diagnostic Studies    Radiology:   XR CHEST PORTABLE   Final Result      No acute disease. CT HEAD WO CONTRAST   Final Result      No acute intracranial abnormality. Consults:     IP CONSULT TO HOSPITALIST  IP CONSULT TO PHARMACY  IP CONSULT TO CARDIOLOGY    Disposition: Skilled nursing facility    Condition at Discharge: Stable    Discharge Instructions/Follow-up:  Monitor BP. HCTZ held due to hypotension. May resume if orthostatics okay. Follow up with cardiology and PCP.     Code Status: Full code    Activity: activity as tolerated    Diet: cardiac diet      Discharge Medications:     Discharge Medication List as of 10/8/2020  4:39 PM           Details   aspirin 81 MG chewable tablet Take 1 tablet by mouth daily, Disp-30 tablet,R-3Normal      ferrous sulfate (IRON 325) 325 (65 Fe) MG tablet Take 1 tablet by mouth daily (with breakfast), Disp-30 tablet,R-3Normal      clopidogrel (PLAVIX) 75 MG tablet Take 1 tablet by mouth daily, Disp-30 tablet,R-3Normal      famotidine (PEPCID) 20 MG tablet Take 1 tablet by mouth 2 times daily, Disp-60 tablet,R-3Normal              Details   lidocaine 4 % external patch Apply 1 patch topically daily for 15 days, Topical, DAILY Starting Thu 10/8/2020, Until Fri 10/23/2020, For 15 days, Disp-1 box,R-1, Normal      potassium chloride (KLOR-CON M) 20 MEQ extended release tablet Take 1 tablet by mouth every other day, Disp-60 tablet,R-3Normal      meclizine (ANTIVERT) 25 MG tablet Take 1 tablet by mouth 3 times daily for 10 days, Disp-30 tablet,R-0Normal      metoprolol succinate (TOPROL XL) 25 MG extended release tablet Take 1 tablet by mouth daily, Disp-30 tablet,R-3Normal              Details   albuterol (PROVENTIL) (2.5 MG/3ML) 0.083% nebulizer solution USE 1 VIAL IN NEBULIZER EVERY 4 HOURS AS NEEDED FOR WHEEZING, Disp-600 mL,R-0Normal      blood glucose monitor strips 1 strip by Other route Test 2 times a day & as needed for symptoms of irregular blood glucose., Other, Historical Med      metFORMIN (GLUCOPHAGE) 500 MG tablet Take 1 tablet by mouth daily (with breakfast), Disp-30 tablet, R-0Normal      amLODIPine (NORVASC) 2.5 MG tablet Take 1 tablet by mouth 2 times daily, Disp-30 tablet, R-3Print      albuterol (PROVENTIL HFA;VENTOLIN HFA) 108 (90 BASE) MCG/ACT inhaler Inhale 2 puffs into the lungs every 6 hours as needed. Time Spent on discharge is more than 30 minutes in the examination, evaluation, counseling and review of medications and discharge plan. Signed:    Diana Coto MD   10/9/2020      Thank you Юлия Whitehead MD for the opportunity to be involved in this patient's care. If you have any questions or concerns please feel free to contact me at [phone].

## 2020-10-10 LAB — SARS-COV-2, NAA: NOT DETECTED
